# Patient Record
Sex: FEMALE | Race: WHITE | NOT HISPANIC OR LATINO | Employment: OTHER
[De-identification: names, ages, dates, MRNs, and addresses within clinical notes are randomized per-mention and may not be internally consistent; named-entity substitution may affect disease eponyms.]

---

## 2018-11-13 ENCOUNTER — RECORDS - HEALTHEAST (OUTPATIENT)
Dept: ADMINISTRATIVE | Facility: OTHER | Age: 73
End: 2018-11-13

## 2020-01-23 ENCOUNTER — RECORDS - HEALTHEAST (OUTPATIENT)
Dept: ADMINISTRATIVE | Facility: OTHER | Age: 75
End: 2020-01-23

## 2020-01-27 ENCOUNTER — TELEPHONE (OUTPATIENT)
Dept: FAMILY MEDICINE | Facility: CLINIC | Age: 75
End: 2020-01-27

## 2020-01-27 ENCOUNTER — OFFICE VISIT (OUTPATIENT)
Dept: FAMILY MEDICINE | Facility: CLINIC | Age: 75
End: 2020-01-27
Payer: MEDICARE

## 2020-01-27 VITALS
BODY MASS INDEX: 27.97 KG/M2 | DIASTOLIC BLOOD PRESSURE: 76 MMHG | HEIGHT: 62 IN | RESPIRATION RATE: 20 BRPM | WEIGHT: 152 LBS | TEMPERATURE: 97 F | SYSTOLIC BLOOD PRESSURE: 124 MMHG | HEART RATE: 76 BPM

## 2020-01-27 DIAGNOSIS — A08.4 VIRAL GASTROENTERITIS: ICD-10-CM

## 2020-01-27 PROCEDURE — 99203 OFFICE O/P NEW LOW 30 MIN: CPT | Performed by: FAMILY MEDICINE

## 2020-01-27 RX ORDER — NETARSUDIL 0.2 MG/ML
SOLUTION/ DROPS OPHTHALMIC; TOPICAL
COMMUNITY
Start: 2020-01-07 | End: 2021-12-30

## 2020-01-27 RX ORDER — SODIUM CHLORIDE 50 MG/G
OINTMENT OPHTHALMIC
COMMUNITY
Start: 2019-11-11 | End: 2021-09-17

## 2020-01-27 RX ORDER — ONDANSETRON 4 MG/1
TABLET, ORALLY DISINTEGRATING ORAL
Qty: 15 TABLET | Refills: 1 | Status: SHIPPED | OUTPATIENT
Start: 2020-01-27 | End: 2021-09-10

## 2020-01-27 RX ORDER — DONEPEZIL HYDROCHLORIDE 10 MG/1
TABLET, FILM COATED ORAL
COMMUNITY
Start: 2019-12-05 | End: 2021-09-10

## 2020-01-27 RX ORDER — LEVOTHYROXINE SODIUM 100 UG/1
100 TABLET ORAL DAILY
COMMUNITY
Start: 2019-11-09 | End: 2022-05-12

## 2020-01-27 RX ORDER — SIMVASTATIN 40 MG
TABLET ORAL
COMMUNITY
Start: 2019-11-09 | End: 2021-08-27

## 2020-01-27 RX ORDER — BIMATOPROST 0.1 MG/ML
SOLUTION/ DROPS OPHTHALMIC
COMMUNITY
Start: 2019-11-11 | End: 2021-12-30

## 2020-01-27 ASSESSMENT — MIFFLIN-ST. JEOR: SCORE: 1137.72

## 2020-01-27 ASSESSMENT — PAIN SCALES - GENERAL: PAINLEVEL: SEVERE PAIN (6)

## 2020-01-27 NOTE — TELEPHONE ENCOUNTER
Patient's daughter, More called today.    She would like patient to be seen at  the 12:00 PM HOLD SLOT with Dr. Danielson today for a hospital f/u.    Patient was seen at [facility].    Patient was seen for a concussion (fall injury); diarrhea; and vomiting.    Please contact daughter for approval.    Thank you.    Central Scheduling  Harper GILMORE

## 2020-01-27 NOTE — PROGRESS NOTES
Subjective     Shalini Mann is a 74 year old female who presents to clinic today for the following health issues:    HPI   Patient seen at [facility]- for viral gastroenteritis and a hematoma noted on forehead from possible fall.  Labs and CT head/cervical spine all were negative.  Advised Follow-up with recheck of blood pressure in clinic.  Still with diarrhea.  Tolerating po fluids well- largely H20 and Gatorade.  Still with low-grade nausea- not taking in much solid foods.  No fever.  No stomach pain.    Current Chronic Medical Conditions  Memory issues  Hypothyroidism  Hyperlipidemia  Glaucoma    Social History  Recently moved here from [place].  Lives in a senior living facility.  Here with daughter.    Patient Active Problem List   Diagnosis     Viral gastroenteritis     History reviewed. No pertinent surgical history.    Social History     Tobacco Use     Smoking status: Never Smoker     Smokeless tobacco: Never Used   Substance Use Topics     Alcohol use: Not on file     History reviewed. No pertinent family history.      Current Outpatient Medications   Medication Sig Dispense Refill     donepezil (ARICEPT) 10 MG tablet        levothyroxine (SYNTHROID/LEVOTHROID) 100 MCG tablet        LUMIGAN 0.01 % SOLN ophthalmic solution        BEBETO 128 5 % ophthalmic ointment APPLY TO LOWER CULDESAC OF EACH EYE NIGHTLY BEFORE BED       omeprazole (PRILOSEC) 20 MG DR capsule        ondansetron (ZOFRAN-ODT) 4 MG ODT tab 1-2 tablets q 8 hours prn nausea 15 tablet 1     RHOPRESSA 0.02 % SOLN        simvastatin (ZOCOR) 40 MG tablet        No Known Allergies  No lab results found.   BP Readings from Last 3 Encounters:   01/27/20 124/76   07/21/15 137/69    Wt Readings from Last 3 Encounters:   01/27/20 68.9 kg (152 lb)                    Reviewed and updated as needed this visit by Provider         Review of Systems   ROS COMP: Constitutional, HEENT, cardiovascular, pulmonary, gi and gu systems are negative,  "except as otherwise noted.      Objective    /76   Pulse 76   Temp 97  F (36.1  C) (Tympanic)   Resp 20   Ht 1.575 m (5' 2\")   Wt 68.9 kg (152 lb)   LMP  (LMP Unknown)   Breastfeeding No   BMI 27.80 kg/m    Body mass index is 27.8 kg/m .  Physical Exam   GENERAL: healthy, alert and no distress  EYES: Eyes grossly normal to inspection, PERRL and conjunctivae and sclerae normal  NECK: no adenopathy, no asymmetry, masses, or scars and thyroid normal to palpation  RESP: lungs clear to auscultation - no rales, rhonchi or wheezes  CV: regular rate and rhythm, normal S1 S2, no S3 or S4, no murmur, click or rub, no peripheral edema and peripheral pulses strong  ABDOMEN: soft, nontender, no hepatosplenomegaly, no masses and bowel sounds normal  MS: no gross musculoskeletal defects noted, no edema  PSYCH: mentation appears normal, affect normal/bright        Assessment & Plan     1. Viral gastroenteritis    - ondansetron (ZOFRAN-ODT) 4 MG ODT tab; 1-2 tablets q 8 hours prn nausea  Dispense: 15 tablet; Refill: 1    Recommend changing over to Smart Water and Gatorade and white soda only- no further tap water only as she needs the electrolytes and calorie replacement along with close adherence to a BRAT diet for nutrition.  Zofran prescribed so will begin to eat as states she feels hungry.  Close Follow-up if no change or worsening symptoms for stool sample testing and/or IVF prn.    Pooja Danielson MD  [facility]    "

## 2020-01-27 NOTE — TELEPHONE ENCOUNTER
I talked to More, pt's daughter.  Got pt scheduled for ER f/u appt today.  No new meds.  No fall.  CT done in ER.  Pt is still having diarrhea. Since last Thursday.  In ER on 1/23/2020. Got IV fluids and zofran.  Diarrhea every 4 hours.  Still voiding.  Rec pushing fluids.  JULIETA Reynoso

## 2020-01-29 ENCOUNTER — TELEPHONE (OUTPATIENT)
Dept: FAMILY MEDICINE | Facility: CLINIC | Age: 75
End: 2020-01-29

## 2020-01-29 NOTE — TELEPHONE ENCOUNTER
Spoke with dtr  She said her mom looks terrible but she hasn't had any more diarrhea since she was in on Monday  But having a lot of nausea, upper abd pain and gas from both ends  No fever  Monday afternoon-had banana and ritz crackers, applesauce- and drank 24oz of smart water  Then kept complaining her stomach hurts-higher up  Hasn't pooped in almost 48 hours(reassurance given)  Is urinating fine due to drinking enough fluids  Keeps saying she is nauseated  No gallbladder  Reassurance given  Advised to stop brat diet but continue bland  And to give 2 zofran and then have chic noodle soup, jello and/or sherbet  If mom gets in terrible abdominal pain and/or has fever   They should go to the ER  dtr in agreement with the plan  Hui Medina RN

## 2020-01-29 NOTE — TELEPHONE ENCOUNTER
Reason for Call:  Other call back    Detailed comments: Patients daughter states her SX are still persisting and she would like a call back to know if she should come back in or take her to the ER or what is advised. Please assist. Thanks!    Phone Number Patient can be reached at: Other phone number: [phone]    Best Time: Any    Can we leave a detailed message on this number? YES    Call taken on 1/29/2020 at 10:17 AM by Kim Sue

## 2020-02-20 ENCOUNTER — COMMUNICATION - HEALTHEAST (OUTPATIENT)
Dept: FAMILY MEDICINE | Facility: CLINIC | Age: 75
End: 2020-02-20

## 2020-02-20 ENCOUNTER — OFFICE VISIT - HEALTHEAST (OUTPATIENT)
Dept: FAMILY MEDICINE | Facility: CLINIC | Age: 75
End: 2020-02-20

## 2020-02-20 DIAGNOSIS — Z86.0100 HISTORY OF COLONIC POLYPS: ICD-10-CM

## 2020-02-20 DIAGNOSIS — K52.9 GASTROENTERITIS: ICD-10-CM

## 2020-02-20 DIAGNOSIS — K21.9 GASTROESOPHAGEAL REFLUX DISEASE WITHOUT ESOPHAGITIS: ICD-10-CM

## 2020-02-20 DIAGNOSIS — E53.8 VITAMIN B12 DEFICIENCY (NON ANEMIC): ICD-10-CM

## 2020-02-20 DIAGNOSIS — E78.2 MIXED HYPERLIPIDEMIA: ICD-10-CM

## 2020-02-20 DIAGNOSIS — G31.84 MILD COGNITIVE IMPAIRMENT WITH MEMORY LOSS: ICD-10-CM

## 2020-02-20 DIAGNOSIS — Z86.007 HISTORY OF SQUAMOUS CELL CARCINOMA IN SITU: ICD-10-CM

## 2020-02-20 DIAGNOSIS — E89.0 POSTABLATIVE HYPOTHYROIDISM: ICD-10-CM

## 2020-02-20 DIAGNOSIS — R79.9 ABNORMAL FINDING OF BLOOD CHEMISTRY, UNSPECIFIED: ICD-10-CM

## 2020-02-20 DIAGNOSIS — R63.4 WEIGHT LOSS: ICD-10-CM

## 2020-02-20 DIAGNOSIS — M85.80 OSTEOPENIA, UNSPECIFIED LOCATION: ICD-10-CM

## 2020-02-20 LAB
ALBUMIN SERPL-MCNC: 3.8 G/DL (ref 3.5–5)
ALP SERPL-CCNC: 68 U/L (ref 45–120)
ALT SERPL W P-5'-P-CCNC: <9 U/L (ref 0–45)
ANION GAP SERPL CALCULATED.3IONS-SCNC: 9 MMOL/L (ref 5–18)
AST SERPL W P-5'-P-CCNC: 13 U/L (ref 0–40)
BILIRUB SERPL-MCNC: 0.7 MG/DL (ref 0–1)
BUN SERPL-MCNC: 9 MG/DL (ref 8–28)
CALCIUM SERPL-MCNC: 9.4 MG/DL (ref 8.5–10.5)
CHLORIDE BLD-SCNC: 101 MMOL/L (ref 98–107)
CO2 SERPL-SCNC: 27 MMOL/L (ref 22–31)
CREAT SERPL-MCNC: 0.65 MG/DL (ref 0.6–1.1)
ERYTHROCYTE [DISTWIDTH] IN BLOOD BY AUTOMATED COUNT: 12 % (ref 11–14.5)
FERRITIN SERPL-MCNC: 99 NG/ML (ref 10–130)
GFR SERPL CREATININE-BSD FRML MDRD: >60 ML/MIN/1.73M2
GLUCOSE BLD-MCNC: 93 MG/DL (ref 70–125)
HCT VFR BLD AUTO: 36.1 % (ref 35–47)
HGB BLD-MCNC: 12 G/DL (ref 12–16)
MCH RBC QN AUTO: 29.4 PG (ref 27–34)
MCHC RBC AUTO-ENTMCNC: 33.1 G/DL (ref 32–36)
MCV RBC AUTO: 89 FL (ref 80–100)
PLATELET # BLD AUTO: 254 THOU/UL (ref 140–440)
PMV BLD AUTO: 6.9 FL (ref 7–10)
POTASSIUM BLD-SCNC: 3.7 MMOL/L (ref 3.5–5)
PROT SERPL-MCNC: 6.4 G/DL (ref 6–8)
RBC # BLD AUTO: 4.07 MILL/UL (ref 3.8–5.4)
SODIUM SERPL-SCNC: 137 MMOL/L (ref 136–145)
TSH SERPL DL<=0.005 MIU/L-ACNC: 0.37 UIU/ML (ref 0.3–5)
VIT B12 SERPL-MCNC: 354 PG/ML (ref 213–816)
WBC: 6.4 THOU/UL (ref 4–11)

## 2020-02-20 ASSESSMENT — MIFFLIN-ST. JEOR: SCORE: 1117.41

## 2020-02-24 ENCOUNTER — COMMUNICATION - HEALTHEAST (OUTPATIENT)
Dept: FAMILY MEDICINE | Facility: CLINIC | Age: 75
End: 2020-02-24

## 2020-02-25 ENCOUNTER — AMBULATORY - HEALTHEAST (OUTPATIENT)
Dept: LAB | Facility: CLINIC | Age: 75
End: 2020-02-25

## 2020-02-25 DIAGNOSIS — R63.4 WEIGHT LOSS: ICD-10-CM

## 2020-02-25 DIAGNOSIS — K52.9 GASTROENTERITIS: ICD-10-CM

## 2020-02-25 LAB
ALBUMIN UR-MCNC: NEGATIVE MG/DL
APPEARANCE UR: CLEAR
BILIRUB UR QL STRIP: NEGATIVE
C DIFF TOX B STL QL: NEGATIVE
COLOR UR AUTO: YELLOW
GLUCOSE UR STRIP-MCNC: NEGATIVE MG/DL
HGB UR QL STRIP: NEGATIVE
KETONES UR STRIP-MCNC: NEGATIVE MG/DL
LEUKOCYTE ESTERASE UR QL STRIP: NEGATIVE
NITRATE UR QL: NEGATIVE
PH UR STRIP: 7 [PH] (ref 5–8)
RIBOTYPE 027/NAP1/BI: NORMAL
SP GR UR STRIP: 1.01 (ref 1–1.03)
UROBILINOGEN UR STRIP-ACNC: NORMAL

## 2020-02-26 LAB
G LAMBLIA AG STL QL IA: NORMAL
O+P STL MICRO: NORMAL
O+P STL MICRO: NORMAL
SHIGA TOXIN 1: NEGATIVE
SHIGA TOXIN 2: NEGATIVE

## 2020-02-28 LAB — BACTERIA SPEC CULT: NORMAL

## 2020-03-02 ENCOUNTER — COMMUNICATION - HEALTHEAST (OUTPATIENT)
Dept: FAMILY MEDICINE | Facility: CLINIC | Age: 75
End: 2020-03-02

## 2020-08-04 ENCOUNTER — COMMUNICATION - HEALTHEAST (OUTPATIENT)
Dept: FAMILY MEDICINE | Facility: CLINIC | Age: 75
End: 2020-08-04

## 2020-08-04 DIAGNOSIS — E89.0 POSTABLATIVE HYPOTHYROIDISM: ICD-10-CM

## 2020-08-04 DIAGNOSIS — E78.2 MIXED HYPERLIPIDEMIA: ICD-10-CM

## 2020-08-04 DIAGNOSIS — K21.9 GASTROESOPHAGEAL REFLUX DISEASE WITHOUT ESOPHAGITIS: ICD-10-CM

## 2020-08-05 ENCOUNTER — COMMUNICATION - HEALTHEAST (OUTPATIENT)
Dept: FAMILY MEDICINE | Facility: CLINIC | Age: 75
End: 2020-08-05

## 2020-11-09 ENCOUNTER — COMMUNICATION - HEALTHEAST (OUTPATIENT)
Dept: FAMILY MEDICINE | Facility: CLINIC | Age: 75
End: 2020-11-09

## 2020-11-09 DIAGNOSIS — E78.2 MIXED HYPERLIPIDEMIA: ICD-10-CM

## 2020-11-09 DIAGNOSIS — K21.9 GASTROESOPHAGEAL REFLUX DISEASE WITHOUT ESOPHAGITIS: ICD-10-CM

## 2020-11-09 DIAGNOSIS — E89.0 POSTABLATIVE HYPOTHYROIDISM: ICD-10-CM

## 2020-11-10 ENCOUNTER — COMMUNICATION - HEALTHEAST (OUTPATIENT)
Dept: FAMILY MEDICINE | Facility: CLINIC | Age: 75
End: 2020-11-10

## 2020-11-10 DIAGNOSIS — E89.0 POSTABLATIVE HYPOTHYROIDISM: ICD-10-CM

## 2021-01-19 ENCOUNTER — COMMUNICATION - HEALTHEAST (OUTPATIENT)
Dept: FAMILY MEDICINE | Facility: CLINIC | Age: 76
End: 2021-01-19

## 2021-01-19 DIAGNOSIS — E89.0 POSTABLATIVE HYPOTHYROIDISM: ICD-10-CM

## 2021-04-13 ENCOUNTER — COMMUNICATION - HEALTHEAST (OUTPATIENT)
Dept: FAMILY MEDICINE | Facility: CLINIC | Age: 76
End: 2021-04-13

## 2021-04-13 DIAGNOSIS — E89.0 POSTABLATIVE HYPOTHYROIDISM: ICD-10-CM

## 2021-04-22 ENCOUNTER — COMMUNICATION - HEALTHEAST (OUTPATIENT)
Dept: FAMILY MEDICINE | Facility: CLINIC | Age: 76
End: 2021-04-22

## 2021-04-22 DIAGNOSIS — E78.2 MIXED HYPERLIPIDEMIA: ICD-10-CM

## 2021-04-22 DIAGNOSIS — K21.9 GASTROESOPHAGEAL REFLUX DISEASE WITHOUT ESOPHAGITIS: ICD-10-CM

## 2021-05-16 ENCOUNTER — COMMUNICATION - HEALTHEAST (OUTPATIENT)
Dept: FAMILY MEDICINE | Facility: CLINIC | Age: 76
End: 2021-05-16

## 2021-05-16 DIAGNOSIS — K21.9 GASTROESOPHAGEAL REFLUX DISEASE WITHOUT ESOPHAGITIS: ICD-10-CM

## 2021-05-16 DIAGNOSIS — E78.2 MIXED HYPERLIPIDEMIA: ICD-10-CM

## 2021-05-27 ENCOUNTER — RECORDS - HEALTHEAST (OUTPATIENT)
Dept: ADMINISTRATIVE | Facility: CLINIC | Age: 76
End: 2021-05-27

## 2021-06-04 VITALS
WEIGHT: 149.5 LBS | BODY MASS INDEX: 27.51 KG/M2 | SYSTOLIC BLOOD PRESSURE: 104 MMHG | HEIGHT: 62 IN | HEART RATE: 64 BPM | DIASTOLIC BLOOD PRESSURE: 62 MMHG

## 2021-06-06 NOTE — TELEPHONE ENCOUNTER
Left message to call back for: More pts daughter  Information to relay to patient:  Please see results message below from Nguyen and relay to evi Aguero daughter when she calls back. Thanks!

## 2021-06-06 NOTE — TELEPHONE ENCOUNTER
----- Message from Nguyen Rivera CNP sent at 2/28/2020  2:53 PM CST -----  Please call patient's daughter and let her know that all of the stool cultures were negative.  I recommend proceeding with colonoscopy as we discussed

## 2021-06-06 NOTE — TELEPHONE ENCOUNTER
Who is calling:  Patient   Reason for Call:  Patient has questions on instructions for Lab portions/ stool sample, etc   Date of last appointment with primary care: 2/20/20  Okay to leave a detailed message: Yes, Patient would like to speak to care team about instructions for Lab

## 2021-06-06 NOTE — PROGRESS NOTES
Assessment & Plan   1. Gastroenteritis  Acute gastroenteritis 1 month ago with persistent watery diarrhea.  Check electrolytes and kidney function.  We will send her home with stool kits today to bring back.  She does have a history of colon polyps and is due for her colonoscopy.  Additionally she has noted black stools and a weight loss of 19 pounds in the last year.  Highly recommended completing this in the near future and referral order was placed.  - HM2(CBC w/o Differential)  - Comprehensive Metabolic Panel  - Ferritin  - C. difficile Toxigenic by PCR; Future  - Giardia Detection, Stool; Future  - Ova and Parasite, Stool; Future  - Culture, Stool; Future  - Ova and Parasite, Stool; Future  - Ambulatory referral for Colonoscopy  - Urinalysis-UC if Indicated    2. Weight loss  Unclear how much related to recent illness and how much previous weight loss.  Did discuss possible relation with advancing Alzheimer's disease.  She did a dietary recall with me today and she is seemingly consuming sufficient calories for her body mass.  - Ambulatory referral for Colonoscopy    3. History of colonic polyps  - Ambulatory referral for Colonoscopy    4. Mild cognitive impairment with memory loss  Follows with Dr. Valencia, neurology.  No longer taking donepezil.    5. Postablative hypothyroidism  Recheck TSH  - Thyroid Cascade    6. History of squamous cell carcinoma in situ    7. Vitamin B12 deficiency (non anemic)  Recheck B12 and advise on dosing.  - Vitamin B12    8. Gastroesophageal reflux disease without esophagitis    9. Mixed hyperlipidemia    10. Osteopenia, unspecified location    11. Abnormal finding of blood chemistry, unspecified   - Ferritin    Nguyen Rivera CNP     Total Time: 45 minutes.  Coordination of Care Time: 40 minutes spent including:  History, exam, discussion of possible diagnoses, testing today, next steps and follow up.    Subjective   Chief Complaint:  Establish Care; Abdominal Pain; and  Diarrhea    HPI:   Shalini Mann is a 74 y.o. female who presents for establish care.   Transfer from [facility] bring records. PMH notable for Grave's with postablative hypothyroidism, Alzheimers, B12 deficiency, hyperlipidemia, SCC, colon polyps.  Moved here last summer to be near daughter. Lives in senior living, independent apartment.      Her main concern today is GI symptoms.  The facility in which she lives was hit with a norovirus in mid January.  The entire place was quarantined for 3 weeks.  Her daughter states she stayed with her for approximately a week with cramping abdominal pain and diarrhea.  She was seen in the [facility] emergency room on 1/23.  Labs normal at that time.  Her daughter does note that symptoms began approximately 10 months days after starting 10mg of donepezil.  They have subsequently stopped that medication however diarrhea has continued now.  She continues to have at least 1-2 watery stools a day.  She states it was previously brown though now has a black/green appearance.  No blood noted in the stool.  Initially cramping and low appetite.  Appetite has now returned to normal.  She is eating a regular diet.  She only feels pain in the abdomen immediately before the need to have a bowel movement and this is relieved after having a bowel movement.  She does have a history of colon polyps.  Last colonoscopy in 2010 with instructions to repeat in 10 years.    Daughter notes she has had approximately 19 pound weight loss in the last year.  They are unsure how much weight she has lost within the last month    Cognitive: History of Alzheimer's.  Started on donepezil 10mg.  Follows with Dr. Valencia    Thyroid: History of Graves' disease, post ablative hypothyroidism.  Currently on levothyroxine 100 mcg.  Last check over a year ago.    B12 deficiency: Daughter states they did IM injections monthly for approximately a year though she has not had an injection in several months.  She is not  "taking any oral supplements.      Allergies:  has No Known Allergies.    SH/FH:  Social History and Family History reviewed and updated.   Tobacco Status:  She  reports that she has quit smoking. She has never used smokeless tobacco.    Review of Systems:  A complete head to toe ROS is negative unless otherwise noted in HPI    Objective     Vitals:    02/20/20 1121   BP: 104/62   Patient Site: Right Arm   Patient Position: Sitting   Cuff Size: Adult Large   Pulse: 64   Weight: 149 lb 8 oz (67.8 kg)   Height: 5' 1.75\" (1.568 m)       Physical Exam:  GENERAL: Alert, well-appearing female .   PSYCH: Pleasant mood, affect appropriate.  Good judgment and insight.   SKIN: No rashes  EYES: Conjunctiva pink, sclera white, no exudates. KIRK.  EOMs intact.   EARS: TMs pearly grey, no bulging, redness, retraction.   NOSE: Nares patent, no discharge.  Normal nasal mucosa, septum and turbinates.  MOUTH: Pharynx moist, pink without exudate. No tonsillar enlargement  NECK: No lymphadenopathy. Thyroid enlarged, smooth borders  CV: Regular rate and rhythm without murmurs, rubs or gallops.  RESP: Lung sounds clear  ABDOMEN: BS+. Abdomen soft.  TTP in LLQ.  No organomegaly, masses or hernias        "

## 2021-06-06 NOTE — TELEPHONE ENCOUNTER
Spoke with pts daughter and relayed results message.  No further questions at this time.  She will drop off the stool samples tomorrow.

## 2021-06-06 NOTE — TELEPHONE ENCOUNTER
----- Message from Nguyen Rivera CNP sent at 2/21/2020  2:18 PM CST -----  Please call patient's daughter (main contact).  All of her labs look completely normal.  I recommend continuing to try to focus on hydration and we will see what her stool studies look like when those return.

## 2021-06-06 NOTE — PATIENT INSTRUCTIONS - HE
Recommendations from today's visit                                                       1. We will check labs today to ensure you are staying well hydrated and kidneys are doing well.  We will also check your thyroid and several other labs due to weight loss    2. We will collect stool samples to look for signs of treatable infection.     3. I ordered a repeat colonoscopy as well and you will receive a call to schedule this.     Next appointment: 6 months    To reschedule your appointment, please call the clinic directly at [phone].   It was a pleasure seeing you today! I look forward to seeing you again.

## 2021-06-06 NOTE — TELEPHONE ENCOUNTER
Patient Returning Call  Reason for call: Lab results.  More, the patient's daughter returning Celia, [facility]'s call.   Information relayed to patient: Writer unable to relay the message below as indicated. Writer informed the caller there is not a consent to communicate on file.    Patient has additional questions:  Yes  If YES, what are your questions/concerns: The patient's daughter would like Celia to return call.  Okay to leave a detailed message?: No   Writer did not ask. The caller is upset due to writer unable to give message.  More, the patient's daughter. [phone]

## 2021-06-10 NOTE — TELEPHONE ENCOUNTER
RN cannot approve Refill Request    RN can NOT refill this medication historical medication requested. Last office visit: 2/20/2020 Nguyen Rivera CNP Last Physical: Visit date not found Last MTM visit: Visit date not found Last visit same specialty: 2/20/2020 Nguyen Rivera CNP.  Next visit within 3 mo: Visit date not found  Next physical within 3 mo: Visit date not found      Elise Goldstein, [facility] Triage/Med Refill 8/5/2020    Requested Prescriptions   Pending Prescriptions Disp Refills     levothyroxine (SYNTHROID, LEVOTHROID) 100 MCG tablet [Pharmacy Med Name: LEVOTHYROXINE 100 MCG TABLET] 90 tablet 1     Sig: TAKE 1 TABLET BY MOUTH EVERY DAY       Thyroid Hormones Protocol Passed - 8/5/2020 11:14 AM        Passed - Provider visit in past 12 months or next 3 months     Last office visit with prescriber/PCP: 2/20/2020 Nguyen Rivera CNP OR same dept: 2/20/2020 Nguyen Rivera CNP OR same specialty: 2/20/2020 Nguyen Rivera CNP  Last physical: Visit date not found Last MTM visit: Visit date not found   Next visit within 3 mo: Visit date not found  Next physical within 3 mo: Visit date not found  Prescriber OR PCP: Nguyen Rivera CNP  Last diagnosis associated with med order: There are no diagnoses linked to this encounter.  If protocol passes may refill for 12 months if within 3 months of last provider visit (or a total of 15 months).             Passed - TSH on file in past 12 months for patient age 12 & older     TSH   Date Value Ref Range Status   02/20/2020 0.37 0.30 - 5.00 uIU/mL Final

## 2021-06-10 NOTE — TELEPHONE ENCOUNTER
Medication Request  Medication name:   levothyroxine (SYNTHROID, LEVOTHROID) 100 MCG tablet    11/9/2019      Class: Historical Med       omeprazole (PRILOSEC) 20 MG capsule    11/9/2019      Class: Historical Med       simvastatin (ZOCOR) 40 MG tablet    11/9/2019      Class: Historical Med           Requested Pharmacy: [facility]  Reason for request: Patient is out of meds.    Okay to leave a detailed message: yes

## 2021-06-12 NOTE — TELEPHONE ENCOUNTER
Refill Request  Did you contact pharmacy: No  Medication name:   Requested Prescriptions     Pending Prescriptions Disp Refills     levothyroxine (SYNTHROID, LEVOTHROID) 100 MCG tablet 90 tablet 0     Sig: Take 1 tablet (100 mcg total) by mouth Daily at 6:00 am.     omeprazole (PRILOSEC) 20 MG capsule 90 capsule 0     Sig: Take 1 capsule (20 mg total) by mouth daily before breakfast.     simvastatin (ZOCOR) 40 MG tablet 90 tablet 0     Sig: Take 1 tablet (40 mg total) by mouth at bedtime.     Who prescribed the medication: Terry Araiza  Requested Pharmacy: [facility]  Is patient out of medication: No.  3 days left  Patient notified refills processed in 3 business days:  yes  Okay to leave a detailed message: no

## 2021-06-13 NOTE — TELEPHONE ENCOUNTER
Refill Approved    Rx renewed per Medication Renewal Policy. Medication was last renewed on 8/5/20.    Elise Goldstein, [facility] Triage/Med Refill 11/12/2020     Requested Prescriptions   Pending Prescriptions Disp Refills     levothyroxine (SYNTHROID, LEVOTHROID) 100 MCG tablet 90 tablet 0     Sig: Take 1 tablet (100 mcg total) by mouth Daily at 6:00 am.       Thyroid Hormones Protocol Passed - 11/12/2020 11:10 AM        Passed - Provider visit in past 12 months or next 3 months     Last office visit with prescriber/PCP: 2/20/2020 Nguyen Rivera CNP OR same dept: 2/20/2020 Nguyen Rivera CNP OR same specialty: 2/20/2020 Nguyen Rivera CNP  Last physical: Visit date not found Last MTM visit: Visit date not found   Next visit within 3 mo: Visit date not found  Next physical within 3 mo: Visit date not found  Prescriber OR PCP: Nguyen Rivera CNP  Last diagnosis associated with med order: 1. Postablative hypothyroidism  - levothyroxine (SYNTHROID, LEVOTHROID) 100 MCG tablet; Take 1 tablet (100 mcg total) by mouth Daily at 6:00 am.  Dispense: 90 tablet; Refill: 0    2. Gastroesophageal reflux disease without esophagitis  - omeprazole (PRILOSEC) 20 MG capsule; Take 1 capsule (20 mg total) by mouth daily before breakfast.  Dispense: 90 capsule; Refill: 0    3. Mixed hyperlipidemia  - simvastatin (ZOCOR) 40 MG tablet; Take 1 tablet (40 mg total) by mouth at bedtime.  Dispense: 90 tablet; Refill: 0    If protocol passes may refill for 12 months if within 3 months of last provider visit (or a total of 15 months).             Passed - TSH on file in past 12 months for patient age 12 & older     TSH   Date Value Ref Range Status   02/20/2020 0.37 0.30 - 5.00 uIU/mL Final                      omeprazole (PRILOSEC) 20 MG capsule 90 capsule 0     Sig: Take 1 capsule (20 mg total) by mouth daily before breakfast.       GI Medications Refill Protocol Passed - 11/12/2020 11:10 AM        Passed - PCP or prescribing provider  visit in last 12 or next 3 months.     Last office visit with prescriber/PCP: 2/20/2020 Nguyen Rivera CNP OR same dept: 2/20/2020 Nguyen Rivera CNP OR same specialty: 2/20/2020 Nguyen Rivera CNP  Last physical: Visit date not found Last MTM visit: Visit date not found   Next visit within 3 mo: Visit date not found  Next physical within 3 mo: Visit date not found  Prescriber OR PCP: Nguyen Rivera CNP  Last diagnosis associated with med order: 1. Postablative hypothyroidism  - levothyroxine (SYNTHROID, LEVOTHROID) 100 MCG tablet; Take 1 tablet (100 mcg total) by mouth Daily at 6:00 am.  Dispense: 90 tablet; Refill: 0    2. Gastroesophageal reflux disease without esophagitis  - omeprazole (PRILOSEC) 20 MG capsule; Take 1 capsule (20 mg total) by mouth daily before breakfast.  Dispense: 90 capsule; Refill: 0    3. Mixed hyperlipidemia  - simvastatin (ZOCOR) 40 MG tablet; Take 1 tablet (40 mg total) by mouth at bedtime.  Dispense: 90 tablet; Refill: 0    If protocol passes may refill for 12 months if within 3 months of last provider visit (or a total of 15 months).                simvastatin (ZOCOR) 40 MG tablet 90 tablet 0     Sig: Take 1 tablet (40 mg total) by mouth at bedtime.       Statins Refill Protocol (Hmg CoA Reductase Inhibitors) Passed - 11/12/2020 11:10 AM        Passed - PCP or prescribing provider visit in past 12 months      Last office visit with prescriber/PCP: 2/20/2020 Nguyen Rivera CNP OR same dept: 2/20/2020 Nguyen Rivera CNP OR same specialty: 2/20/2020 Nguyen Rivera CNP  Last physical: Visit date not found Last MTM visit: Visit date not found   Next visit within 3 mo: Visit date not found  Next physical within 3 mo: Visit date not found  Prescriber OR PCP: Nguyen Rivera CNP  Last diagnosis associated with med order: 1. Postablative hypothyroidism  - levothyroxine (SYNTHROID, LEVOTHROID) 100 MCG tablet; Take 1 tablet (100 mcg total) by mouth Daily at 6:00 am.  Dispense: 90 tablet; Refill:  0    2. Gastroesophageal reflux disease without esophagitis  - omeprazole (PRILOSEC) 20 MG capsule; Take 1 capsule (20 mg total) by mouth daily before breakfast.  Dispense: 90 capsule; Refill: 0    3. Mixed hyperlipidemia  - simvastatin (ZOCOR) 40 MG tablet; Take 1 tablet (40 mg total) by mouth at bedtime.  Dispense: 90 tablet; Refill: 0    If protocol passes may refill for 12 months if within 3 months of last provider visit (or a total of 15 months).

## 2021-06-13 NOTE — TELEPHONE ENCOUNTER
Refill Approved    Rx renewed per Medication Renewal Policy. Medication was last renewed on 11/12/20, last OV 2/20/20.    Rashmi Luna, [facility] Triage/Med Refill 11/14/2020     Requested Prescriptions   Pending Prescriptions Disp Refills     levothyroxine (SYNTHROID, LEVOTHROID) 100 MCG tablet [Pharmacy Med Name: LEVOTHYROXINE 100 MCG TABLET] 90 tablet 0     Sig: TAKE 1 TABLET BY MOUTH DAILY AT 6:00AM       Thyroid Hormones Protocol Passed - 11/10/2020 10:09 AM        Passed - Provider visit in past 12 months or next 3 months     Last office visit with prescriber/PCP: Visit date not found OR same dept: 2/20/2020 Nguyen Rivera CNP OR same specialty: 2/20/2020 Nguyen Rivera CNP  Last physical: Visit date not found Last MTM visit: Visit date not found   Next visit within 3 mo: Visit date not found  Next physical within 3 mo: Visit date not found  Prescriber OR PCP: Terry Araiza MD  Last diagnosis associated with med order: 1. Postablative hypothyroidism  - levothyroxine (SYNTHROID, LEVOTHROID) 100 MCG tablet [Pharmacy Med Name: LEVOTHYROXINE 100 MCG TABLET]; TAKE 1 TABLET BY MOUTH DAILY AT 6:00AM  Dispense: 90 tablet; Refill: 0    If protocol passes may refill for 12 months if within 3 months of last provider visit (or a total of 15 months).             Passed - TSH on file in past 12 months for patient age 12 & older     TSH   Date Value Ref Range Status   02/20/2020 0.37 0.30 - 5.00 uIU/mL Final

## 2021-06-14 NOTE — TELEPHONE ENCOUNTER
Refill Approved    Rx renewed per Medication Renewal Policy. Medication was last renewed on 11/12/20.    Elise Goldstein, [facility] Triage/Med Refill 1/20/2021     Requested Prescriptions   Pending Prescriptions Disp Refills     levothyroxine (SYNTHROID, LEVOTHROID) 100 MCG tablet [Pharmacy Med Name: LEVOTHYROXINE 100 MCG TABLET] 90 tablet 0     Sig: TAKE 1 TABLET (100 MCG TOTAL) BY MOUTH DAILY AT 6:00 AM.       Thyroid Hormones Protocol Passed - 1/19/2021  2:48 PM        Passed - Provider visit in past 12 months or next 3 months     Last office visit with prescriber/PCP: 2/20/2020 Nguyen Rivera CNP OR same dept: 2/20/2020 Nguyen Rivera CNP OR same specialty: 2/20/2020 Nguyen Rivera CNP  Last physical: Visit date not found Last MTM visit: Visit date not found   Next visit within 3 mo: Visit date not found  Next physical within 3 mo: Visit date not found  Prescriber OR PCP: Nguyen Rivera CNP  Last diagnosis associated with med order: 1. Postablative hypothyroidism  - levothyroxine (SYNTHROID, LEVOTHROID) 100 MCG tablet [Pharmacy Med Name: LEVOTHYROXINE 100 MCG TABLET]; Take 1 tablet (100 mcg total) by mouth Daily at 6:00 am.  Dispense: 90 tablet; Refill: 0    If protocol passes may refill for 12 months if within 3 months of last provider visit (or a total of 15 months).             Passed - TSH on file in past 12 months for patient age 12 & older     TSH   Date Value Ref Range Status   02/20/2020 0.37 0.30 - 5.00 uIU/mL Final

## 2021-06-16 NOTE — TELEPHONE ENCOUNTER
RN cannot approve Refill Request    RN can NOT refill this medication Protocol failed and NO refill given. Last office visit: 2/20/2020 Nguyen Rivera CNP Last Physical: Visit date not found Last MTM visit: Visit date not found Last visit same specialty: 2/20/2020 Nguyen Rivera CNP.  Next visit within 3 mo: Visit date not found  Next physical within 3 mo: Visit date not found      Deniz Ferrara, [facility] Triage/Med Refill 4/23/2021    Requested Prescriptions   Pending Prescriptions Disp Refills     simvastatin (ZOCOR) 40 MG tablet [Pharmacy Med Name: SIMVASTATIN 40 MG TABLET] 90 tablet 0     Sig: TAKE 1 TABLET BY MOUTH EVERYDAY AT BEDTIME       Statins Refill Protocol (Hmg CoA Reductase Inhibitors) Failed - 4/22/2021 11:49 AM        Failed - PCP or prescribing provider visit in past 12 months      Last office visit with prescriber/PCP: 2/20/2020 Nguyen Rivera CNP OR same dept: Visit date not found OR same specialty: 2/20/2020 Nguyen Rivera CNP  Last physical: Visit date not found Last MTM visit: Visit date not found   Next visit within 3 mo: Visit date not found  Next physical within 3 mo: Visit date not found  Prescriber OR PCP: Nguyen Rivera CNP  Last diagnosis associated with med order: 1. Mixed hyperlipidemia  - simvastatin (ZOCOR) 40 MG tablet [Pharmacy Med Name: SIMVASTATIN 40 MG TABLET]; TAKE 1 TABLET BY MOUTH EVERYDAY AT BEDTIME  Dispense: 90 tablet; Refill: 0    2. Gastroesophageal reflux disease without esophagitis  - omeprazole (PRILOSEC) 20 MG capsule [Pharmacy Med Name: OMEPRAZOLE DR 20 MG CAPSULE]; Take 1 capsule (20 mg total) by mouth daily before breakfast.  Dispense: 90 capsule; Refill: 0    If protocol passes may refill for 12 months if within 3 months of last provider visit (or a total of 15 months).                omeprazole (PRILOSEC) 20 MG capsule [Pharmacy Med Name: OMEPRAZOLE DR 20 MG CAPSULE] 90 capsule 0     Sig: TAKE 1 CAPSULE (20 MG TOTAL) BY MOUTH DAILY BEFORE BREAKFAST.        GI Medications Refill Protocol Failed - 4/22/2021 11:49 AM        Failed - PCP or prescribing provider visit in last 12 or next 3 months.     Last office visit with prescriber/PCP: 2/20/2020 Nguyen Rivera CNP OR same dept: Visit date not found OR same specialty: 2/20/2020 Nguyen Rivera CNP  Last physical: Visit date not found Last MTM visit: Visit date not found   Next visit within 3 mo: Visit date not found  Next physical within 3 mo: Visit date not found  Prescriber OR PCP: Nguyen Rivera CNP  Last diagnosis associated with med order: 1. Mixed hyperlipidemia  - simvastatin (ZOCOR) 40 MG tablet [Pharmacy Med Name: SIMVASTATIN 40 MG TABLET]; TAKE 1 TABLET BY MOUTH EVERYDAY AT BEDTIME  Dispense: 90 tablet; Refill: 0    2. Gastroesophageal reflux disease without esophagitis  - omeprazole (PRILOSEC) 20 MG capsule [Pharmacy Med Name: OMEPRAZOLE DR 20 MG CAPSULE]; Take 1 capsule (20 mg total) by mouth daily before breakfast.  Dispense: 90 capsule; Refill: 0    If protocol passes may refill for 12 months if within 3 months of last provider visit (or a total of 15 months).

## 2021-06-16 NOTE — TELEPHONE ENCOUNTER
RN cannot approve Refill Request    RN can NOT refill this medication PCP messaged that patient is overdue for Labs. Last office visit: 2/20/2020 Nguyen Rivera CNP Last Physical: Visit date not found Last MTM visit: Visit date not found Last visit same specialty: 2/20/2020 Nguyen Rivera CNP.  Next visit within 3 mo: Visit date not found  Next physical within 3 mo: Visit date not found      Rashmi Luna, [facility] Triage/Med Refill 4/13/2021    Requested Prescriptions   Pending Prescriptions Disp Refills     levothyroxine (SYNTHROID, LEVOTHROID) 100 MCG tablet [Pharmacy Med Name: LEVOTHYROXINE 100 MCG TABLET] 90 tablet 0     Sig: TAKE 1 TABLET (100 MCG TOTAL) BY MOUTH DAILY AT 6:00 AM.       Thyroid Hormones Protocol Failed - 4/13/2021 12:08 AM        Failed - Provider visit in past 12 months or next 3 months     Last office visit with prescriber/PCP: 2/20/2020 Nguyen Rivera CNP OR same dept: Visit date not found OR same specialty: 2/20/2020 Nguyen Rivera CNP  Last physical: Visit date not found Last MTM visit: Visit date not found   Next visit within 3 mo: Visit date not found  Next physical within 3 mo: Visit date not found  Prescriber OR PCP: Nguyen Rivera CNP  Last diagnosis associated with med order: 1. Postablative hypothyroidism  - levothyroxine (SYNTHROID, LEVOTHROID) 100 MCG tablet [Pharmacy Med Name: LEVOTHYROXINE 100 MCG TABLET]; TAKE 1 TABLET (100 MCG TOTAL) BY MOUTH DAILY AT 6:00 AM.  Dispense: 90 tablet; Refill: 0    If protocol passes may refill for 12 months if within 3 months of last provider visit (or a total of 15 months).             Failed - TSH on file in past 12 months for patient age 12 & older     TSH   Date Value Ref Range Status   02/20/2020 0.37 0.30 - 5.00 uIU/mL Final

## 2021-06-17 NOTE — TELEPHONE ENCOUNTER
RN cannot approve Refill Request    RN can NOT refill this medication PCP messaged that patient is overdue for Office Visit. Last office visit: Visit date not found Last Physical: Visit date not found Last MTM visit: Visit date not found Last visit same specialty: 2/20/2020 Nguyen Rivera CNP.  Next visit within 3 mo: Visit date not found  Next physical within 3 mo: Visit date not found      Rashmi Luna, [facility] Triage/Med Refill 5/16/2021    Requested Prescriptions   Pending Prescriptions Disp Refills     omeprazole (PRILOSEC) 20 MG capsule [Pharmacy Med Name: OMEPRAZOLE DR 20 MG CAPSULE] 30 capsule 0     Sig: TAKE 1 CAPSULE (20 MG TOTAL) BY MOUTH DAILY BEFORE BREAKFAST.       GI Medications Refill Protocol Failed - 5/16/2021 12:30 PM        Failed - PCP or prescribing provider visit in last 12 or next 3 months.     Last office visit with prescriber/PCP: Visit date not found OR same dept: Visit date not found OR same specialty: 2/20/2020 Nguyen Rivera CNP  Last physical: Visit date not found Last MTM visit: Visit date not found   Next visit within 3 mo: Visit date not found  Next physical within 3 mo: Visit date not found  Prescriber OR PCP: Kierra Green MD  Last diagnosis associated with med order: 1. Gastroesophageal reflux disease without esophagitis  - omeprazole (PRILOSEC) 20 MG capsule [Pharmacy Med Name: OMEPRAZOLE DR 20 MG CAPSULE]; TAKE 1 CAPSULE (20 MG TOTAL) BY MOUTH DAILY BEFORE BREAKFAST.  Dispense: 30 capsule; Refill: 0    2. Mixed hyperlipidemia  - simvastatin (ZOCOR) 40 MG tablet [Pharmacy Med Name: SIMVASTATIN 40 MG TABLET]; TAKE 1 TABLET BY MOUTH EVERYDAY AT BEDTIME  Dispense: 30 tablet; Refill: 0    If protocol passes may refill for 12 months if within 3 months of last provider visit (or a total of 15 months).                simvastatin (ZOCOR) 40 MG tablet [Pharmacy Med Name: SIMVASTATIN 40 MG TABLET] 30 tablet 0     Sig: TAKE 1 TABLET BY MOUTH EVERYDAY AT BEDTIME       Statins  Refill Protocol (Hmg CoA Reductase Inhibitors) Failed - 5/16/2021 12:30 PM        Failed - PCP or prescribing provider visit in past 12 months      Last office visit with prescriber/PCP: Visit date not found OR same dept: Visit date not found OR same specialty: 2/20/2020 Nguyen Rivera CNP  Last physical: Visit date not found Last MTM visit: Visit date not found   Next visit within 3 mo: Visit date not found  Next physical within 3 mo: Visit date not found  Prescriber OR PCP: Kierra Green MD  Last diagnosis associated with med order: 1. Gastroesophageal reflux disease without esophagitis  - omeprazole (PRILOSEC) 20 MG capsule [Pharmacy Med Name: OMEPRAZOLE DR 20 MG CAPSULE]; TAKE 1 CAPSULE (20 MG TOTAL) BY MOUTH DAILY BEFORE BREAKFAST.  Dispense: 30 capsule; Refill: 0    2. Mixed hyperlipidemia  - simvastatin (ZOCOR) 40 MG tablet [Pharmacy Med Name: SIMVASTATIN 40 MG TABLET]; TAKE 1 TABLET BY MOUTH EVERYDAY AT BEDTIME  Dispense: 30 tablet; Refill: 0    If protocol passes may refill for 12 months if within 3 months of last provider visit (or a total of 15 months).

## 2021-09-10 ENCOUNTER — ASSISTED LIVING VISIT (OUTPATIENT)
Dept: GERIATRICS | Facility: CLINIC | Age: 76
End: 2021-09-10
Payer: MEDICARE

## 2021-09-10 DIAGNOSIS — E78.5 DYSLIPIDEMIA: Primary | ICD-10-CM

## 2021-09-10 DIAGNOSIS — E03.9 HYPOTHYROIDISM, UNSPECIFIED TYPE: ICD-10-CM

## 2021-09-10 DIAGNOSIS — K21.00 GASTROESOPHAGEAL REFLUX DISEASE WITH ESOPHAGITIS WITHOUT HEMORRHAGE: ICD-10-CM

## 2021-09-10 DIAGNOSIS — E53.8 VITAMIN B12 DEFICIENCY (NON ANEMIC): ICD-10-CM

## 2021-09-10 RX ORDER — QUETIAPINE FUMARATE 25 MG/1
12.5 TABLET, FILM COATED ORAL 2 TIMES DAILY
COMMUNITY
End: 2021-09-17

## 2021-09-10 NOTE — PROGRESS NOTES
[facility] GERIATRIC SERVICES    Facility:   [facility] ([place]) [[id number]]   Code Status: DNI      HPI:   Shalini is a 76 year old female who is now admitted into the assisted living memory care unit room 2006 and who I the opportunity to visit with today secondary to medication management of her chronic medical conditions.  She is on Synthroid 100 mcg secondary hypothyroidism.  She is due for TSH.  She is also on Prilosec for GERD at 20 mg.  Denies any GERD or pyrosis.  She is also on Zocor 40 mg for dyslipidemia and is due for lipid panel.  She is also on eyedrops for glaucoma.  I had a chance to talk with her daughter More today.  She is now requiring the dementia unit.  There is some evening time perseveration as well as restlessness and we did talk about that sundowning type of behaviors and we talked about various medications and at this point we will initiate Seroquel 12.5 mg at 4 PM and then another 12.5 mg at bedtime.  In looking through the computer there have been no vital signs performed and no nursing notes related.  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  She recently moved up here from [place] about 2 years ago.  She denies any aches or pains.  Denies any swallowing problems colds or flus.  Is able to ambulate without any assistive device.    Past Medical History:  No past medical history on file.  Hypothyroidism, glaucoma, hyperlipidemia, dementia  Surgical History:  No past surgical history on file.  Unable to obtain secondary to dementia  Family History:   No family history on file.  Not able to obtain for dementia.  Social History:    Social History     Socioeconomic History     Marital status:      Spouse name: Not on file     Number of children: Not on file     Years of education: Not on file     Highest education level: Not on file   Occupational History     Not on file   Tobacco Use     Smoking status: Former Smoker     Smokeless  tobacco: Never Used   Substance and Sexual Activity     Alcohol use: Not on file     Drug use: Not on file     Sexual activity: Not on file   Other Topics Concern     Not on file   Social History Narrative     Not on file     Social Determinants of Health     Financial Resource Strain:      Difficulty of Paying Living Expenses:    Food Insecurity:      Worried About Running Out of Food in the Last Year:      Ran Out of Food in the Last Year:    Transportation Needs:      Lack of Transportation (Medical):      Lack of Transportation (Non-Medical):    Physical Activity:      Days of Exercise per Week:      Minutes of Exercise per Session:    Stress:      Feeling of Stress :    Social Connections:      Frequency of Communication with Friends and Family:      Frequency of Social Gatherings with Friends and Family:      Attends Islam Services:      Active Member of Clubs or Organizations:      Attends Club or Organization Meetings:      Marital Status:    Intimate Partner Violence:      Fear of Current or Ex-Partner:      Emotionally Abused:      Physically Abused:      Sexually Abused:         REVIEW OF SYSTEM:  According to staff there have been no reports of fever chills fatigue sore throat postnasal drip colds flus chills fever nausea vomiting diarrhea dysuria frequency urgency unusual myalgias or arthralgias.    PHYSICAL EXAM:   Pleasant female in no acute distress.  Head is normocephalic.  Conjunctiva is pink and sclerae clear.  Neck is supple without adenopathy.  Lung sounds are clear throughout.  Cardiovascular S1-S2, regular rate and rhythm.  No lower extremity edema.  Gastrointestinal nontender and nondistended.  Musculoskeletal able to ambulate without any assistive device.  Denies pain to her major joints.  Cognitive has dementia.  Pleasant affect.    There were no vitals filed for this visit.      Medication List:  Current Outpatient Medications   Medication Sig     levothyroxine (SYNTHROID/LEVOTHROID)  100 MCG tablet      LUMIGAN 0.01 % SOLN ophthalmic solution      BEBETO 128 5 % ophthalmic ointment APPLY TO LOWER CULDESAC OF EACH EYE NIGHTLY BEFORE BED     omeprazole (PRILOSEC) 20 MG DR capsule TAKE 1 CAPSULE (20 MG TOTAL) BY MOUTH DAILY BEFORE BREAKFAST.     RHOPRESSA 0.02 % SOLN      simvastatin (ZOCOR) 40 MG tablet TAKE 1 TABLET BY MOUTH EVERYDAY AT BEDTIME     No current facility-administered medications for this visit.        Labs:  TSH   Date Value Ref Range Status   02/20/2020 0.37 0.30 - 5.00 uIU/mL Final     No results for input(s): CHOL, HDL, LDL, TRIG, CHOLHDLRATIO in the last 89572 hours.  Liver Function Studies - Recent Labs   Lab Test 02/20/20  1218   PROTTOTAL 6.4   ALBUMIN 3.8   BILITOTAL 0.7   ALKPHOS 68   AST 13   ALT <9     CBC RESULTS: Recent Labs   Lab Test 02/20/20  1218   WBC 6.4   RBC 4.07   HGB 12.0   HCT 36.1   MCV 89   MCH 29.4   MCHC 33.1   RDW 12.0            Assessment:   (E78.5) Dyslipidemia  (primary encounter diagnosis)  Comment: He is on Zocor 40 mg  Plan: Rechecking the lipid panel on September 13    (E03.9) Hypothyroidism, unspecified type  Comment: Is on Synthroid 100 mcg  Plan: Rechecking TSH September 13    (E53.8) Vitamin B12 deficiency (non anemic)  Comment: History of B12 deficiency  Plan: Obtaining a B12 level    (K21.00) Gastroesophageal reflux disease with esophagitis without hemorrhage  Comment: Stable  Plan: Continue with omeprazole 20 mg      PLAN:    Obtaining a B12, TSH, lipid panel and CMP for September 13.  I also did call her daughter More today getting him gathering further information as well as a plan of care including the restlessness and sundowning type symptoms.  We will also add Seroquel 12.5 mg at 4 PM as well as another 12.5 mg at bedtime.    For documentation purposes total visit 45 minutes which over 50% was spent with the patient initially going over her history, establishing a relationship, listening to her stories, going over her  medications, going over her previous laboratory studies, nutrition as well as the remainder of the time spent not only talking with nursing and charge nurse staff but also her daughter More.      Electronically signed by: Seth Rider NP

## 2021-09-11 ENCOUNTER — LAB REQUISITION (OUTPATIENT)
Dept: LAB | Facility: CLINIC | Age: 76
End: 2021-09-11
Payer: MEDICARE

## 2021-09-11 DIAGNOSIS — E78.41 ELEVATED LIPOPROTEIN(A): ICD-10-CM

## 2021-09-11 DIAGNOSIS — E03.8 OTHER SPECIFIED HYPOTHYROIDISM: ICD-10-CM

## 2021-09-13 LAB
CHOLEST SERPL-MCNC: 168 MG/DL
FASTING STATUS PATIENT QL REPORTED: NORMAL
HDLC SERPL-MCNC: 58 MG/DL
LDLC SERPL CALC-MCNC: 89 MG/DL
TRIGL SERPL-MCNC: 106 MG/DL
TSH SERPL DL<=0.005 MIU/L-ACNC: <0.01 UIU/ML (ref 0.3–5)

## 2021-09-13 PROCEDURE — 84443 ASSAY THYROID STIM HORMONE: CPT | Mod: ORL | Performed by: FAMILY MEDICINE

## 2021-09-13 PROCEDURE — 80061 LIPID PANEL: CPT | Mod: ORL | Performed by: FAMILY MEDICINE

## 2021-09-13 PROCEDURE — 36415 COLL VENOUS BLD VENIPUNCTURE: CPT | Mod: ORL | Performed by: FAMILY MEDICINE

## 2021-09-13 PROCEDURE — P9603 ONE-WAY ALLOW PRORATED MILES: HCPCS | Mod: ORL | Performed by: FAMILY MEDICINE

## 2021-09-17 DIAGNOSIS — H04.123 DRY EYES: ICD-10-CM

## 2021-09-17 DIAGNOSIS — G30.9 ALZHEIMER'S DEMENTIA WITHOUT BEHAVIORAL DISTURBANCE, UNSPECIFIED TIMING OF DEMENTIA ONSET: Primary | ICD-10-CM

## 2021-09-17 DIAGNOSIS — F02.80 ALZHEIMER'S DEMENTIA WITHOUT BEHAVIORAL DISTURBANCE, UNSPECIFIED TIMING OF DEMENTIA ONSET: Primary | ICD-10-CM

## 2021-09-17 RX ORDER — QUETIAPINE FUMARATE 25 MG/1
TABLET, FILM COATED ORAL
Qty: 30 TABLET | Refills: 11 | Status: SHIPPED | OUTPATIENT
Start: 2021-09-17 | End: 2022-03-22

## 2021-09-17 RX ORDER — SODIUM CHLORIDE 50 MG/G
OINTMENT OPHTHALMIC
Qty: 3.5 G | Refills: 10 | Status: SHIPPED | OUTPATIENT
Start: 2021-09-17

## 2021-10-12 ENCOUNTER — LAB REQUISITION (OUTPATIENT)
Dept: LAB | Facility: CLINIC | Age: 76
End: 2021-10-12
Payer: MEDICARE

## 2021-10-12 DIAGNOSIS — E03.8 OTHER SPECIFIED HYPOTHYROIDISM: ICD-10-CM

## 2021-10-13 LAB — TSH SERPL DL<=0.005 MIU/L-ACNC: 19 UIU/ML (ref 0.3–5)

## 2021-10-13 PROCEDURE — P9604 ONE-WAY ALLOW PRORATED TRIP: HCPCS | Mod: ORL | Performed by: FAMILY MEDICINE

## 2021-10-13 PROCEDURE — 36415 COLL VENOUS BLD VENIPUNCTURE: CPT | Mod: ORL | Performed by: FAMILY MEDICINE

## 2021-10-13 PROCEDURE — 84443 ASSAY THYROID STIM HORMONE: CPT | Mod: ORL | Performed by: FAMILY MEDICINE

## 2021-10-15 ENCOUNTER — ASSISTED LIVING VISIT (OUTPATIENT)
Dept: GERIATRICS | Facility: CLINIC | Age: 76
End: 2021-10-15
Payer: MEDICARE

## 2021-10-15 VITALS
OXYGEN SATURATION: 97 % | DIASTOLIC BLOOD PRESSURE: 68 MMHG | BODY MASS INDEX: 28.87 KG/M2 | TEMPERATURE: 97.9 F | RESPIRATION RATE: 18 BRPM | SYSTOLIC BLOOD PRESSURE: 128 MMHG | HEART RATE: 74 BPM | WEIGHT: 156.6 LBS

## 2021-10-15 DIAGNOSIS — K21.00 GASTROESOPHAGEAL REFLUX DISEASE WITH ESOPHAGITIS WITHOUT HEMORRHAGE: ICD-10-CM

## 2021-10-15 DIAGNOSIS — F03.90 SENILE DEMENTIA, UNCOMPLICATED (H): ICD-10-CM

## 2021-10-15 DIAGNOSIS — E03.9 HYPOTHYROIDISM, UNSPECIFIED TYPE: Primary | ICD-10-CM

## 2021-10-15 DIAGNOSIS — R53.81 PHYSICAL DEBILITY: ICD-10-CM

## 2021-10-15 PROBLEM — F09 MILD COGNITIVE DISORDER: Status: ACTIVE | Noted: 2018-08-31

## 2021-10-15 PROBLEM — E53.8 VITAMIN B12 DEFICIENCY (NON ANEMIC): Status: ACTIVE | Noted: 2018-03-01

## 2021-10-15 PROBLEM — Z86.007 HISTORY OF SQUAMOUS CELL CARCINOMA IN SITU: Status: ACTIVE | Noted: 2020-02-20

## 2021-10-15 NOTE — PROGRESS NOTES
[facility] GERIATRIC SERVICES    Facility:   [facility] ([place]) [[id number]]   Code Status: DNR/DNI      CHIEF COMPLAINT/REASON FOR VISIT:  Chief Complaint   Patient presents with     RECHECK     asked to see, tsh, med mgmt       HISTORY:      HPI: Shalini is a 76 year old female Who I was asked to visit with secondary to recent TSH as well as medication management of chronic medical conditions.  Her TSH previously was 0.01 and she was on Synthroid 100 mcg and that was discontinued with a recheck of her TSH on October 13.  The TSH did come back at 19 and now we will put her on 50 mcg of Synthroid and recheck another TSH on November 15.  Had a pleasant visit with her as well.  She has a very nice woman and does have a lot of family pictures and she was able to share with me the family itself as well as various names and her prognosis of her family.  About 2 months ago it did end up adding Seroquel 12.5 mg at 4 PM and again at 8 PM secondary to evening time psychosis and restlessness and that has been helpful and the charge nurse does not feel we need to do anything suggesting.  There have been no recent vital signs performed.  She states that she has been in her normal state of health.  She did not have any colds or flus and overall feels pretty good and does like to participate in the activities.    No past medical history on file.         No family history on file.   Social History     Socioeconomic History     Marital status:      Spouse name: Not on file     Number of children: Not on file     Years of education: Not on file     Highest education level: Not on file   Occupational History     Not on file   Tobacco Use     Smoking status: Former Smoker     Smokeless tobacco: Never Used   Substance and Sexual Activity     Alcohol use: Not on file     Drug use: Not on file     Sexual activity: Not on file   Other Topics Concern     Not on file   Social History Narrative     Not on file     Social  Determinants of Health     Financial Resource Strain:      Difficulty of Paying Living Expenses:    Food Insecurity:      Worried About Running Out of Food in the Last Year:      Ran Out of Food in the Last Year:    Transportation Needs:      Lack of Transportation (Medical):      Lack of Transportation (Non-Medical):    Physical Activity:      Days of Exercise per Week:      Minutes of Exercise per Session:    Stress:      Feeling of Stress :    Social Connections:      Frequency of Communication with Friends and Family:      Frequency of Social Gatherings with Friends and Family:      Attends Caodaism Services:      Active Member of Clubs or Organizations:      Attends Club or Organization Meetings:      Marital Status:    Intimate Partner Violence:      Fear of Current or Ex-Partner:      Emotionally Abused:      Physically Abused:      Sexually Abused:         REVIEW OF SYSTEM:  According to staff there have been no reports of fever chills fatigue sore throat postnasal drip colds flus chills fever nausea vomiting diarrhea dysuria frequency urgency unusual myalgias or arthralgias.    PHYSICAL EXAM:   Pleasant female in no acute distress.  Head is normocephalic.  Neck is supple without adenopathy.  Lung sounds are clear throughout.  Cardiovascular S1-S2, regular rate and rhythm.  No lower extremity edema.  Gastrointestinal nontender and nondistended.  Musculoskeletal able to ambulate without any assistive device.  Denies pain to her major joints.  Cognitive has dementia.  Pleasant affect.     Current Outpatient Medications:      levothyroxine (SYNTHROID/LEVOTHROID) 100 MCG tablet, Take 50 mcg by mouth daily , Disp: , Rfl:      LUMIGAN 0.01 % SOLN ophthalmic solution, , Disp: , Rfl:      BEBETO 128 5 % ophthalmic ointment, APPLY TO LOWER CULDECAS OF EACH EYE NIGHTLY BEFORE BED, Disp: 3.5 g, Rfl: 10     omeprazole (PRILOSEC) 20 MG DR capsule, TAKE 1 CAPSULE (20 MG TOTAL) BY MOUTH DAILY BEFORE BREAKFAST., Disp: 30  capsule, Rfl: 0     QUEtiapine (SEROQUEL) 25 MG tablet, 1/2 TAB (12.5MG) BY MOUTH TWICE DAILY (1600 AND BEDTIME) (DX: PSYCHOSIS), Disp: 30 tablet, Rfl: 11     RHOPRESSA 0.02 % SOLN, , Disp: , Rfl:      simvastatin (ZOCOR) 40 MG tablet, TAKE 1 TABLET BY MOUTH EVERYDAY AT BEDTIME, Disp: 30 tablet, Rfl: 0    /68   Pulse 74   Temp 97.9  F (36.6  C)   Resp 18   Wt 71 kg (156 lb 9.6 oz)   LMP  (LMP Unknown)   SpO2 97%   BMI 28.87 kg/m      LABS:   TSH   Date Value Ref Range Status   10/13/2021 19.00 (H) 0.30 - 5.00 uIU/mL Final     Recent Labs   Lab Test 09/13/21  1201   CHOL 168   HDL 58   LDL 89   TRIG 106           ASSESSMENT:    Encounter Diagnoses   Name Primary?     Hypothyroidism, unspecified type Yes     Gastroesophageal reflux disease with esophagitis without hemorrhage      Senile dementia, uncomplicated (H)      Physical debility        PLAN:    Adding in Synthroid 50 mcg daily and rechecking her TSH again on November 15.  Otherwise no new changes of the Seroquel recently added 12.5 mg at 4 PM and again at 8 PM and the staff in charge nurse do feel to be effective for nighttime.  Continue to manage and follow.        Electronically signed by: Seth Rider NP

## 2021-10-15 NOTE — LETTER
10/15/2021        RE: Shalini Mann  [address] [place] [address]        M [facility] GERIATRIC SERVICES    Facility:   [facility] ([place]) [[id number]]   Code Status: DNR/DNI      CHIEF COMPLAINT/REASON FOR VISIT:  Chief Complaint   Patient presents with     RECHECK     asked to see, tsh, med mgmt       HISTORY:      HPI: Shalini is a 76 year old female Who I was asked to visit with secondary to recent TSH as well as medication management of chronic medical conditions.  Her TSH previously was 0.01 and she was on Synthroid 100 mcg and that was discontinued with a recheck of her TSH on October 13.  The TSH did come back at 19 and now we will put her on 50 mcg of Synthroid and recheck another TSH on November 15.  Had a pleasant visit with her as well.  She has a very nice woman and does have a lot of family pictures and she was able to share with me the family itself as well as various names and her prognosis of her family.  About 2 months ago it did end up adding Seroquel 12.5 mg at 4 PM and again at 8 PM secondary to evening time psychosis and restlessness and that has been helpful and the charge nurse does not feel we need to do anything suggesting.  There have been no recent vital signs performed.  She states that she has been in her normal state of health.  She did not have any colds or flus and overall feels pretty good and does like to participate in the activities.    No past medical history on file.         No family history on file.   Social History     Socioeconomic History     Marital status:      Spouse name: Not on file     Number of children: Not on file     Years of education: Not on file     Highest education level: Not on file   Occupational History     Not on file   Tobacco Use     Smoking status: Former Smoker     Smokeless tobacco: Never Used   Substance and Sexual Activity     Alcohol use: Not on file     Drug use: Not on file     Sexual activity: Not on file    Other Topics Concern     Not on file   Social History Narrative     Not on file     Social Determinants of Health     Financial Resource Strain:      Difficulty of Paying Living Expenses:    Food Insecurity:      Worried About Running Out of Food in the Last Year:      Ran Out of Food in the Last Year:    Transportation Needs:      Lack of Transportation (Medical):      Lack of Transportation (Non-Medical):    Physical Activity:      Days of Exercise per Week:      Minutes of Exercise per Session:    Stress:      Feeling of Stress :    Social Connections:      Frequency of Communication with Friends and Family:      Frequency of Social Gatherings with Friends and Family:      Attends Jainism Services:      Active Member of Clubs or Organizations:      Attends Club or Organization Meetings:      Marital Status:    Intimate Partner Violence:      Fear of Current or Ex-Partner:      Emotionally Abused:      Physically Abused:      Sexually Abused:         REVIEW OF SYSTEM:  According to staff there have been no reports of fever chills fatigue sore throat postnasal drip colds flus chills fever nausea vomiting diarrhea dysuria frequency urgency unusual myalgias or arthralgias.    PHYSICAL EXAM:   Pleasant female in no acute distress.  Head is normocephalic.  Neck is supple without adenopathy.  Lung sounds are clear throughout.  Cardiovascular S1-S2, regular rate and rhythm.  No lower extremity edema.  Gastrointestinal nontender and nondistended.  Musculoskeletal able to ambulate without any assistive device.  Denies pain to her major joints.  Cognitive has dementia.  Pleasant affect.     Current Outpatient Medications:      levothyroxine (SYNTHROID/LEVOTHROID) 100 MCG tablet, Take 50 mcg by mouth daily , Disp: , Rfl:      LUMIGAN 0.01 % SOLN ophthalmic solution, , Disp: , Rfl:      BEBETO 128 5 % ophthalmic ointment, APPLY TO LOWER CULDECAS OF EACH EYE NIGHTLY BEFORE BED, Disp: 3.5 g, Rfl: 10     omeprazole (PRILOSEC)  20 MG DR capsule, TAKE 1 CAPSULE (20 MG TOTAL) BY MOUTH DAILY BEFORE BREAKFAST., Disp: 30 capsule, Rfl: 0     QUEtiapine (SEROQUEL) 25 MG tablet, 1/2 TAB (12.5MG) BY MOUTH TWICE DAILY (1600 AND BEDTIME) (DX: PSYCHOSIS), Disp: 30 tablet, Rfl: 11     RHOPRESSA 0.02 % SOLN, , Disp: , Rfl:      simvastatin (ZOCOR) 40 MG tablet, TAKE 1 TABLET BY MOUTH EVERYDAY AT BEDTIME, Disp: 30 tablet, Rfl: 0    /68   Pulse 74   Temp 97.9  F (36.6  C)   Resp 18   Wt 71 kg (156 lb 9.6 oz)   LMP  (LMP Unknown)   SpO2 97%   BMI 28.87 kg/m      LABS:   TSH   Date Value Ref Range Status   10/13/2021 19.00 (H) 0.30 - 5.00 uIU/mL Final     Recent Labs   Lab Test 09/13/21  1201   CHOL 168   HDL 58   LDL 89   TRIG 106           ASSESSMENT:    Encounter Diagnoses   Name Primary?     Hypothyroidism, unspecified type Yes     Gastroesophageal reflux disease with esophagitis without hemorrhage      Senile dementia, uncomplicated (H)      Physical debility        PLAN:    Adding in Synthroid 50 mcg daily and rechecking her TSH again on November 15.  Otherwise no new changes of the Seroquel recently added 12.5 mg at 4 PM and again at 8 PM and the staff in charge nurse do feel to be effective for nighttime.  Continue to manage and follow.        Electronically signed by: Seth Rider NP          Sincerely,        Seth Rider NP

## 2021-10-21 DIAGNOSIS — E78.2 MIXED HYPERLIPIDEMIA: ICD-10-CM

## 2021-10-21 RX ORDER — SIMVASTATIN 40 MG
TABLET ORAL
Qty: 30 TABLET | Refills: 10 | Status: SHIPPED | OUTPATIENT
Start: 2021-10-21 | End: 2022-08-22

## 2021-10-21 NOTE — TELEPHONE ENCOUNTER
May refill non-narcotic medications as needed for patients in Assisted Living or equivalent care setting

## 2021-11-11 ENCOUNTER — LAB REQUISITION (OUTPATIENT)
Dept: LAB | Facility: CLINIC | Age: 76
End: 2021-11-11
Payer: MEDICARE

## 2021-11-11 DIAGNOSIS — F03.90 UNSPECIFIED DEMENTIA WITHOUT BEHAVIORAL DISTURBANCE: ICD-10-CM

## 2021-11-11 DIAGNOSIS — H40.003 PREGLAUCOMA, UNSPECIFIED, BILATERAL: ICD-10-CM

## 2021-11-11 DIAGNOSIS — K52.89 OTHER SPECIFIED NONINFECTIVE GASTROENTERITIS AND COLITIS: ICD-10-CM

## 2021-11-12 ENCOUNTER — LAB REQUISITION (OUTPATIENT)
Dept: LAB | Facility: CLINIC | Age: 76
End: 2021-11-12
Payer: MEDICARE

## 2021-11-12 DIAGNOSIS — E03.9 HYPOTHYROIDISM, UNSPECIFIED: ICD-10-CM

## 2021-11-15 LAB — TSH SERPL DL<=0.005 MIU/L-ACNC: 28.83 UIU/ML (ref 0.3–5)

## 2021-11-15 PROCEDURE — P9603 ONE-WAY ALLOW PRORATED MILES: HCPCS | Mod: ORL | Performed by: FAMILY MEDICINE

## 2021-11-15 PROCEDURE — 36415 COLL VENOUS BLD VENIPUNCTURE: CPT | Mod: ORL | Performed by: FAMILY MEDICINE

## 2021-11-15 PROCEDURE — 84443 ASSAY THYROID STIM HORMONE: CPT | Mod: ORL | Performed by: FAMILY MEDICINE

## 2021-12-17 ENCOUNTER — LAB REQUISITION (OUTPATIENT)
Dept: LAB | Facility: CLINIC | Age: 76
End: 2021-12-17
Payer: MEDICARE

## 2021-12-17 DIAGNOSIS — E03.9 HYPOTHYROIDISM, UNSPECIFIED: ICD-10-CM

## 2021-12-21 LAB — TSH SERPL DL<=0.005 MIU/L-ACNC: 1.39 UIU/ML (ref 0.3–5)

## 2021-12-21 PROCEDURE — P9604 ONE-WAY ALLOW PRORATED TRIP: HCPCS | Mod: ORL | Performed by: FAMILY MEDICINE

## 2021-12-21 PROCEDURE — 84443 ASSAY THYROID STIM HORMONE: CPT | Mod: ORL | Performed by: FAMILY MEDICINE

## 2021-12-21 PROCEDURE — 36415 COLL VENOUS BLD VENIPUNCTURE: CPT | Mod: ORL | Performed by: FAMILY MEDICINE

## 2021-12-30 DIAGNOSIS — H26.8 PSEUDOEXFOLIATION OF LENS CAPSULE: Primary | ICD-10-CM

## 2021-12-30 RX ORDER — BRIMONIDINE TARTRATE, TIMOLOL MALEATE 2; 5 MG/ML; MG/ML
SOLUTION/ DROPS OPHTHALMIC
Qty: 5 ML | Refills: 11 | Status: SHIPPED | OUTPATIENT
Start: 2021-12-30 | End: 2023-01-01

## 2021-12-30 RX ORDER — NETARSUDIL 0.2 MG/ML
SOLUTION/ DROPS OPHTHALMIC; TOPICAL
Qty: 2.5 ML | Refills: 11 | Status: SHIPPED | OUTPATIENT
Start: 2021-12-30

## 2021-12-30 RX ORDER — CARBOXYMETHYLCELLULOSE SODIUM 5 MG/ML
SOLUTION/ DROPS OPHTHALMIC
Qty: 15 ML | Refills: 11 | Status: SHIPPED | OUTPATIENT
Start: 2021-12-30 | End: 2023-01-01

## 2021-12-30 RX ORDER — BIMATOPROST 0.1 MG/ML
SOLUTION/ DROPS OPHTHALMIC
Qty: 2.5 ML | Refills: 11 | Status: SHIPPED | OUTPATIENT
Start: 2021-12-30 | End: 2023-01-01

## 2021-12-30 RX ORDER — HYPOC ACID/SOD HYPO/NACL/WATER 0.02 %
SPRAY, NON-AEROSOL (ML) TOPICAL
Qty: 59 ML | Refills: 11 | Status: SHIPPED | OUTPATIENT
Start: 2021-12-30 | End: 2023-01-01

## 2022-01-01 DIAGNOSIS — E03.9 HYPOTHYROIDISM, UNSPECIFIED TYPE: ICD-10-CM

## 2022-01-01 RX ORDER — QUETIAPINE FUMARATE 25 MG/1
TABLET, FILM COATED ORAL
Qty: 90 TABLET | Refills: 11
Start: 2022-01-01 | End: 2023-01-01

## 2022-01-01 RX ORDER — LEVOTHYROXINE SODIUM 88 UG/1
88 TABLET ORAL DAILY
Qty: 90 TABLET | Refills: 3 | Status: SHIPPED | OUTPATIENT
Start: 2022-01-01 | End: 2022-01-01

## 2022-01-01 RX ORDER — LEVOTHYROXINE SODIUM 88 UG/1
88 TABLET ORAL DAILY
Qty: 90 TABLET | Refills: 3 | Status: SHIPPED | OUTPATIENT
Start: 2022-01-01 | End: 2023-01-01

## 2022-01-10 ENCOUNTER — OFFICE VISIT (OUTPATIENT)
Dept: GERIATRICS | Facility: CLINIC | Age: 77
End: 2022-01-10
Payer: MEDICARE

## 2022-01-10 VITALS
HEART RATE: 74 BPM | WEIGHT: 156.8 LBS | BODY MASS INDEX: 28.91 KG/M2 | SYSTOLIC BLOOD PRESSURE: 155 MMHG | TEMPERATURE: 96.7 F | RESPIRATION RATE: 19 BRPM | DIASTOLIC BLOOD PRESSURE: 78 MMHG | OXYGEN SATURATION: 97 %

## 2022-01-10 DIAGNOSIS — E03.9 HYPOTHYROIDISM, UNSPECIFIED TYPE: Primary | ICD-10-CM

## 2022-01-10 DIAGNOSIS — K21.9 GASTROESOPHAGEAL REFLUX DISEASE WITHOUT ESOPHAGITIS: ICD-10-CM

## 2022-01-10 DIAGNOSIS — M17.10 PRIMARY LOCALIZED OSTEOARTHROSIS OF LOWER LEG, UNSPECIFIED LATERALITY: ICD-10-CM

## 2022-01-10 DIAGNOSIS — E78.5 HYPERLIPIDEMIA, UNSPECIFIED HYPERLIPIDEMIA TYPE: ICD-10-CM

## 2022-01-10 NOTE — PROGRESS NOTES
[facility] GERIATRIC SERVICES    Facility:   [facility] ([place]) [[id number]]   Code Status: DNR/DNI      CHIEF COMPLAINT/REASON FOR VISIT:  Chief Complaint   Patient presents with     FVP Care Coordination - Regulatory     regulatory       HISTORY:      HPI: Shalini is a 76 year old female Who currently lives in the memory care assisted living facility room 2006 and who I was asked to visit with secondary to quarterly review of chronic medical conditions.  Very delightful female.  She does like to participate in the group activities including video exercises.  She also has wonderful stories to tell.  For reflux currently on omeprazole.  Her last cholesterol was in September at 168 currently on Zocor 40 mg, her TSH in December was 1.39 currently on Synthroid 100 mcg.  For mood she is on Seroquel 12.5 mg twice daily no recent changes to her medications.  Is on a number of eyedrops for glaucoma.  Her last blood pressure January 3 was 155/78.  Denies discomfort.  Independent without any assistive device.  She claims her appetite to be good.    No past medical history on file.         No family history on file.   Social History     Socioeconomic History     Marital status:      Spouse name: Not on file     Number of children: Not on file     Years of education: Not on file     Highest education level: Not on file   Occupational History     Not on file   Tobacco Use     Smoking status: Former Smoker     Smokeless tobacco: Never Used   Substance and Sexual Activity     Alcohol use: Not on file     Drug use: Not on file     Sexual activity: Not on file   Other Topics Concern     Not on file   Social History Narrative     Not on file     Social Determinants of Health     Financial Resource Strain: Not on file   Food Insecurity: Not on file   Transportation Needs: Not on file   Physical Activity: Not on file   Stress: Not on file   Social Connections: Not on file   Intimate Partner Violence: Not on file    Housing Stability: Not on file        REVIEW OF SYSTEM:  According to staff there have been no reports of fever chills fatigue sore throat postnasal drip colds flus chills fever nausea vomiting diarrhea dysuria frequency urgency unusual myalgias or arthralgias.    PHYSICAL EXAM:   Pleasant female in no acute distress.  Head is normocephalic.  .  Lung sounds are clear throughout.  Cardiovascular S1-S2, regular rate and rhythm.  No lower extremity edema.  Gastrointestinal nontender and nondistended.  Musculoskeletal able to ambulate without any assistive device.  Denies pain to her major joints.  Cognitive has dementia.  Pleasant affect.         Current Outpatient Medications:      CARBOXYMETHYLCELLULOSE SODIUM 0.5 % SOLN ophthalmic solution, INSTILL 1 DROP IN BOTH EYES TWICE DAILY;USE AS DIRECTED FOUR TIMES DAILY AS NEEDED, Disp: 15 mL, Rfl: 11     COMBIGAN 0.2-0.5 % ophthalmic solution, INSTILL 1 DROP IN THE RIGHT EYE TWICE DAILY, Disp: 5 mL, Rfl: 11     Eyelid Cleansers (OCUSOFT HYPOCHLOR) SOLN, USE SMALL AMOUNT AS DIRECTED AFTER INSTILLING EYE DROPS AS ORDERED, Disp: 59 mL, Rfl: 11     levothyroxine (SYNTHROID/LEVOTHROID) 100 MCG tablet, Take 100 mcg by mouth daily , Disp: , Rfl:      LUMIGAN 0.01 % SOLN ophthalmic solution, INSTILL 1 DROP IN BOTH EYES AT BEDTIME, Disp: 2.5 mL, Rfl: 11     BEBETO 128 5 % ophthalmic ointment, APPLY TO LOWER CULDECAS OF EACH EYE NIGHTLY BEFORE BED, Disp: 3.5 g, Rfl: 10     omeprazole (PRILOSEC) 20 MG DR capsule, TAKE 1 CAPSULE (20 MG TOTAL) BY MOUTH DAILY BEFORE BREAKFAST., Disp: 30 capsule, Rfl: 0     QUEtiapine (SEROQUEL) 25 MG tablet, 1/2 TAB (12.5MG) BY MOUTH TWICE DAILY (1600 AND BEDTIME) (DX: PSYCHOSIS), Disp: 30 tablet, Rfl: 11     RHOPRESSA 0.02 % ophthalmic solution, INSTILL 1 DROP IN THE RIGHT EYE AT BEDTIME, Disp: 2.5 mL, Rfl: 11     simvastatin (ZOCOR) 40 MG tablet, 1 TAB BY MOUTH EVERY BEDTIME, Disp: 30 tablet, Rfl: 10    BP (!) 155/78   Pulse 74   Temp (!) 96.7  F  (35.9  C)   Resp 19   Wt 71.1 kg (156 lb 12.8 oz)   LMP  (LMP Unknown)   SpO2 97%   BMI 28.91 kg/m        LABS:   TSH   Date Value Ref Range Status   12/21/2021 1.39 0.30 - 5.00 uIU/mL Final     Recent Labs   Lab Test 09/13/21  1201   CHOL 168   HDL 58   LDL 89   TRIG 106         ASSESSMENT:    (E03.9) Hypothyroidism, unspecified type  (primary encounter diagnosis)  Comment: Continue with Synthroid 100 mcg  Plan: Continue to manage    (E78.5) Hyperlipidemia, unspecified hyperlipidemia type  Comment: Continue with Zocor 40 mg  Plan: Recheck her lipid panel sometime mid winter or early spring    (M17.10) Primary localized osteoarthrosis of lower leg, unspecified laterality  Comment: Currently not having any discomfort  Plan: Continue to manage.  Able to ambulate independently    (K21.9) Gastroesophageal reflux disease without esophagitis  Comment: Currently on omeprazole 20 mg  Plan: No complaints      Case Management:  I have reviewed the Assisted Living care plan, current immunizations and preventive care/cancer screening.. Future cancer screening is not clinically indicated secondary to age/goals of care.   Patient's desire to return to the community is not assessible due to cognitive impairment.    Information reviewed:  Medications, vital signs, orders, and nursing notes.  PLAN:    No medication changes.  No laboratory studies are necessary.  We can repeat them coming up later on this clinic to early spring.  Very delightful female.  Does have a good sense of humor and does like to participate in the group activities and charge nurse was also very helpful and did not have any problems or concerns with this patient.    Electronically signed by: Seth Rider NP

## 2022-01-10 NOTE — LETTER
1/10/2022        RE: Shalini Mann  [address]  [place] [address]          M [facility] GERIATRIC SERVICES    Facility:   [facility] ([place]) [[id number]]   Code Status: DNR/DNI      CHIEF COMPLAINT/REASON FOR VISIT:  Chief Complaint   Patient presents with     FVP Care Coordination - Regulatory     regulatory       HISTORY:      HPI: Shalini is a 76 year old female Who currently lives in the memory care assisted living facility room 2006 and who I was asked to visit with secondary to quarterly review of chronic medical conditions.  Very delightful female.  She does like to participate in the group activities including video exercises.  She also has wonderful stories to tell.  For reflux currently on omeprazole.  Her last cholesterol was in September at 168 currently on Zocor 40 mg, her TSH in December was 1.39 currently on Synthroid 100 mcg.  For mood she is on Seroquel 12.5 mg twice daily no recent changes to her medications.  Is on a number of eyedrops for glaucoma.  Her last blood pressure January 3 was 155/78.  Denies discomfort.  Independent without any assistive device.  She claims her appetite to be good.    No past medical history on file.         No family history on file.   Social History     Socioeconomic History     Marital status:      Spouse name: Not on file     Number of children: Not on file     Years of education: Not on file     Highest education level: Not on file   Occupational History     Not on file   Tobacco Use     Smoking status: Former Smoker     Smokeless tobacco: Never Used   Substance and Sexual Activity     Alcohol use: Not on file     Drug use: Not on file     Sexual activity: Not on file   Other Topics Concern     Not on file   Social History Narrative     Not on file     Social Determinants of Health     Financial Resource Strain: Not on file   Food Insecurity: Not on file   Transportation Needs: Not on file   Physical Activity: Not on file   Stress:  Not on file   Social Connections: Not on file   Intimate Partner Violence: Not on file   Housing Stability: Not on file        REVIEW OF SYSTEM:  According to staff there have been no reports of fever chills fatigue sore throat postnasal drip colds flus chills fever nausea vomiting diarrhea dysuria frequency urgency unusual myalgias or arthralgias.    PHYSICAL EXAM:   Pleasant female in no acute distress.  Head is normocephalic.  .  Lung sounds are clear throughout.  Cardiovascular S1-S2, regular rate and rhythm.  No lower extremity edema.  Gastrointestinal nontender and nondistended.  Musculoskeletal able to ambulate without any assistive device.  Denies pain to her major joints.  Cognitive has dementia.  Pleasant affect.         Current Outpatient Medications:      CARBOXYMETHYLCELLULOSE SODIUM 0.5 % SOLN ophthalmic solution, INSTILL 1 DROP IN BOTH EYES TWICE DAILY;USE AS DIRECTED FOUR TIMES DAILY AS NEEDED, Disp: 15 mL, Rfl: 11     COMBIGAN 0.2-0.5 % ophthalmic solution, INSTILL 1 DROP IN THE RIGHT EYE TWICE DAILY, Disp: 5 mL, Rfl: 11     Eyelid Cleansers (OCUSOFT HYPOCHLOR) SOLN, USE SMALL AMOUNT AS DIRECTED AFTER INSTILLING EYE DROPS AS ORDERED, Disp: 59 mL, Rfl: 11     levothyroxine (SYNTHROID/LEVOTHROID) 100 MCG tablet, Take 100 mcg by mouth daily , Disp: , Rfl:      LUMIGAN 0.01 % SOLN ophthalmic solution, INSTILL 1 DROP IN BOTH EYES AT BEDTIME, Disp: 2.5 mL, Rfl: 11     BEBETO 128 5 % ophthalmic ointment, APPLY TO LOWER CULDECAS OF EACH EYE NIGHTLY BEFORE BED, Disp: 3.5 g, Rfl: 10     omeprazole (PRILOSEC) 20 MG DR capsule, TAKE 1 CAPSULE (20 MG TOTAL) BY MOUTH DAILY BEFORE BREAKFAST., Disp: 30 capsule, Rfl: 0     QUEtiapine (SEROQUEL) 25 MG tablet, 1/2 TAB (12.5MG) BY MOUTH TWICE DAILY (1600 AND BEDTIME) (DX: PSYCHOSIS), Disp: 30 tablet, Rfl: 11     RHOPRESSA 0.02 % ophthalmic solution, INSTILL 1 DROP IN THE RIGHT EYE AT BEDTIME, Disp: 2.5 mL, Rfl: 11     simvastatin (ZOCOR) 40 MG tablet, 1 TAB BY MOUTH  EVERY BEDTIME, Disp: 30 tablet, Rfl: 10    BP (!) 155/78   Pulse 74   Temp (!) 96.7  F (35.9  C)   Resp 19   Wt 71.1 kg (156 lb 12.8 oz)   LMP  (LMP Unknown)   SpO2 97%   BMI 28.91 kg/m        LABS:   TSH   Date Value Ref Range Status   12/21/2021 1.39 0.30 - 5.00 uIU/mL Final     Recent Labs   Lab Test 09/13/21  1201   CHOL 168   HDL 58   LDL 89   TRIG 106         ASSESSMENT:    (E03.9) Hypothyroidism, unspecified type  (primary encounter diagnosis)  Comment: Continue with Synthroid 100 mcg  Plan: Continue to manage    (E78.5) Hyperlipidemia, unspecified hyperlipidemia type  Comment: Continue with Zocor 40 mg  Plan: Recheck her lipid panel sometime mid winter or early spring    (M17.10) Primary localized osteoarthrosis of lower leg, unspecified laterality  Comment: Currently not having any discomfort  Plan: Continue to manage.  Able to ambulate independently    (K21.9) Gastroesophageal reflux disease without esophagitis  Comment: Currently on omeprazole 20 mg  Plan: No complaints      Case Management:  I have reviewed the Assisted Living care plan, current immunizations and preventive care/cancer screening.. Future cancer screening is not clinically indicated secondary to age/goals of care.   Patient's desire to return to the community is not assessible due to cognitive impairment.    Information reviewed:  Medications, vital signs, orders, and nursing notes.  PLAN:    No medication changes.  No laboratory studies are necessary.  We can repeat them coming up later on this clinic to early spring.  Very delightful female.  Does have a good sense of humor and does like to participate in the group activities and charge nurse was also very helpful and did not have any problems or concerns with this patient.    Electronically signed by: Seth Rider NP            Sincerely,        Seth Rider NP

## 2022-03-22 DIAGNOSIS — F02.80 ALZHEIMER'S DEMENTIA WITHOUT BEHAVIORAL DISTURBANCE, UNSPECIFIED TIMING OF DEMENTIA ONSET: ICD-10-CM

## 2022-03-22 DIAGNOSIS — G30.9 ALZHEIMER'S DEMENTIA WITHOUT BEHAVIORAL DISTURBANCE, UNSPECIFIED TIMING OF DEMENTIA ONSET: ICD-10-CM

## 2022-03-22 RX ORDER — QUETIAPINE FUMARATE 25 MG/1
TABLET, FILM COATED ORAL
Qty: 30 TABLET | Refills: 10 | Status: SHIPPED | OUTPATIENT
Start: 2022-03-22 | End: 2022-06-02

## 2022-04-27 ENCOUNTER — ASSISTED LIVING VISIT (OUTPATIENT)
Dept: GERIATRICS | Facility: CLINIC | Age: 77
End: 2022-04-27
Payer: MEDICARE

## 2022-04-27 VITALS
RESPIRATION RATE: 19 BRPM | HEART RATE: 68 BPM | OXYGEN SATURATION: 98 % | BODY MASS INDEX: 29.5 KG/M2 | WEIGHT: 160 LBS | DIASTOLIC BLOOD PRESSURE: 96 MMHG | TEMPERATURE: 97 F | SYSTOLIC BLOOD PRESSURE: 126 MMHG

## 2022-04-27 DIAGNOSIS — K21.9 GASTROESOPHAGEAL REFLUX DISEASE WITHOUT ESOPHAGITIS: Primary | ICD-10-CM

## 2022-04-27 DIAGNOSIS — F09 MILD COGNITIVE DISORDER: ICD-10-CM

## 2022-04-27 DIAGNOSIS — E78.5 HYPERLIPIDEMIA, UNSPECIFIED HYPERLIPIDEMIA TYPE: ICD-10-CM

## 2022-04-27 DIAGNOSIS — E03.9 HYPOTHYROIDISM, UNSPECIFIED TYPE: ICD-10-CM

## 2022-04-27 NOTE — LETTER
4/27/2022        RE: Shalini Mann  [address]  [place] [address]        M [facility] GERIATRIC SERVICES    Facility:   [facility] ([place]) [[id number]]   Code Status: DNR/DNI      CHIEF COMPLAINT/REASON FOR VISIT:  Chief Complaint   Patient presents with     RECHECK       HISTORY:      HPI: Shalini is a 76 year old female who I was asked to visit with today and who does live in assisted living memory care unit room 2006 secondary to behaviors as well as her recent adjustment of her medication as well as going over her medications with the charge nurse as well as discussion of various medications.  She does live in the memory care assisted living facility.  She is polite.  She is a former .  She has been having some issues with moods and various behaviors and Seroquel was initiated it was eventually at 25 mg twice daily and now recently was increased to 3 times daily and the staff and charge nurse do feel that it has been helpful for her moods and she has been less restless and more cooperative with cares.  Also she has been on omeprazole and no history of GI bleed no complaints of any GERD so discontinue the omeprazole 20 mg.  She is on simvastatin 40 mg her last cholesterol in September was 168.  I prefer to discontinue the Zocor altogether we will wait for the cholesterol first and the charge nurse will get a hold of family.  She is also on Synthroid 100 mcg her TSH in December was 1.39 so we will recheck that as well next week on May 5.  Otherwise she does participate in the group activities including various exercises and various games.    No past medical history on file.         No family history on file.   Social History     Socioeconomic History     Marital status:    Tobacco Use     Smoking status: Former Smoker     Smokeless tobacco: Never Used        REVIEW OF SYSTEM:  According to staff there have been no reports of fever chills fatigue sore throat postnasal  drip colds flus chills fever nausea vomiting diarrhea dysuria frequency urgency unusual myalgias or arthralgias.    PHYSICAL EXAM:   Pleasant female in no acute distress.  Head is normocephalic.  .  Lung sounds are clear throughout.  Cardiovascular S1-S2, regular rate and rhythm.  No lower extremity edema.  Gastrointestinal - nondistended.  Musculoskeletal able to ambulate without any assistive device.  Denies pain to her major joints.  Cognitive has dementia.  Pleasant affect.      Current Outpatient Medications:      CARBOXYMETHYLCELLULOSE SODIUM 0.5 % SOLN ophthalmic solution, INSTILL 1 DROP IN BOTH EYES TWICE DAILY;USE AS DIRECTED FOUR TIMES DAILY AS NEEDED, Disp: 15 mL, Rfl: 11     COMBIGAN 0.2-0.5 % ophthalmic solution, INSTILL 1 DROP IN THE RIGHT EYE TWICE DAILY, Disp: 5 mL, Rfl: 11     Eyelid Cleansers (OCUSOFT HYPOCHLOR) SOLN, USE SMALL AMOUNT AS DIRECTED AFTER INSTILLING EYE DROPS AS ORDERED, Disp: 59 mL, Rfl: 11     levothyroxine (SYNTHROID/LEVOTHROID) 100 MCG tablet, Take 100 mcg by mouth daily , Disp: , Rfl:      LUMIGAN 0.01 % SOLN ophthalmic solution, INSTILL 1 DROP IN BOTH EYES AT BEDTIME, Disp: 2.5 mL, Rfl: 11     BEBETO 128 5 % ophthalmic ointment, APPLY TO LOWER CULDECAS OF EACH EYE NIGHTLY BEFORE BED, Disp: 3.5 g, Rfl: 10     QUEtiapine (SEROQUEL) 25 MG tablet, TAKE ONE-HALF TABLET (12.5 MG) BY MOUTH TWICE A DAY (Patient taking differently: Take 25 mg by mouth 3 times daily), Disp: 30 tablet, Rfl: 10     RHOPRESSA 0.02 % ophthalmic solution, INSTILL 1 DROP IN THE RIGHT EYE AT BEDTIME, Disp: 2.5 mL, Rfl: 11     simvastatin (ZOCOR) 40 MG tablet, 1 TAB BY MOUTH EVERY BEDTIME, Disp: 30 tablet, Rfl: 10      Current Outpatient Medications:      CARBOXYMETHYLCELLULOSE SODIUM 0.5 % SOLN ophthalmic solution, INSTILL 1 DROP IN BOTH EYES TWICE DAILY;USE AS DIRECTED FOUR TIMES DAILY AS NEEDED, Disp: 15 mL, Rfl: 11     COMBIGAN 0.2-0.5 % ophthalmic solution, INSTILL 1 DROP IN THE RIGHT EYE TWICE DAILY, Disp: 5  mL, Rfl: 11     Eyelid Cleansers (OCUSOFT HYPOCHLOR) SOLN, USE SMALL AMOUNT AS DIRECTED AFTER INSTILLING EYE DROPS AS ORDERED, Disp: 59 mL, Rfl: 11     levothyroxine (SYNTHROID/LEVOTHROID) 100 MCG tablet, Take 100 mcg by mouth daily , Disp: , Rfl:      LUMIGAN 0.01 % SOLN ophthalmic solution, INSTILL 1 DROP IN BOTH EYES AT BEDTIME, Disp: 2.5 mL, Rfl: 11     BEBETO 128 5 % ophthalmic ointment, APPLY TO LOWER CULDECAS OF EACH EYE NIGHTLY BEFORE BED, Disp: 3.5 g, Rfl: 10     QUEtiapine (SEROQUEL) 25 MG tablet, TAKE ONE-HALF TABLET (12.5 MG) BY MOUTH TWICE A DAY (Patient taking differently: Take 25 mg by mouth 3 times daily), Disp: 30 tablet, Rfl: 10     RHOPRESSA 0.02 % ophthalmic solution, INSTILL 1 DROP IN THE RIGHT EYE AT BEDTIME, Disp: 2.5 mL, Rfl: 11     simvastatin (ZOCOR) 40 MG tablet, 1 TAB BY MOUTH EVERY BEDTIME, Disp: 30 tablet, Rfl: 10       BP (!) 126/96   Pulse 68   Temp 97  F (36.1  C)   Resp 19   Wt 72.6 kg (160 lb)   LMP  (LMP Unknown)   SpO2 98%   BMI 29.50 kg/m      LABS:   TSH   Date Value Ref Range Status   12/21/2021 1.39 0.30 - 5.00 uIU/mL Final     Recent Labs   Lab Test 09/13/21  1201   CHOL 168   HDL 58   LDL 89   TRIG 106           ASSESSMENT:    Encounter Diagnoses   Name Primary?     Gastroesophageal reflux disease without esophagitis Yes     Hypothyroidism, unspecified type      Hyperlipidemia, unspecified hyperlipidemia type      Mild cognitive disorder        PLAN:    Discontinue the omeprazole 20 mg.  On May 3 get a TSH as well as a lipid panel.  I like to discontinue the simvastatin 40 mg.  In the meantime she has been normotensive with the last blood pressure check on April 3 at 126/96 she has been afebrile and also on room air.  Her moods have improved with the addition of Seroquel now at 25 mg 3 times daily.  Staff will keep me updated for any other problems or concerns.        Electronically signed by: Seth Rider NP          Sincerely,        Seth Rider NP

## 2022-04-27 NOTE — PROGRESS NOTES
[facility] GERIATRIC SERVICES    Facility:   [facility] ([place]) [[id number]]   Code Status: DNR/DNI      CHIEF COMPLAINT/REASON FOR VISIT:  Chief Complaint   Patient presents with     RECHECK       HISTORY:      HPI: Shalini is a 76 year old female who I was asked to visit with today and who does live in assisted living memory care unit room 2006 secondary to behaviors as well as her recent adjustment of her medication as well as going over her medications with the charge nurse as well as discussion of various medications.  She does live in the memory care assisted living facility.  She is polite.  She is a former .  She has been having some issues with moods and various behaviors and Seroquel was initiated it was eventually at 25 mg twice daily and now recently was increased to 3 times daily and the staff and charge nurse do feel that it has been helpful for her moods and she has been less restless and more cooperative with cares.  Also she has been on omeprazole and no history of GI bleed no complaints of any GERD so discontinue the omeprazole 20 mg.  She is on simvastatin 40 mg her last cholesterol in September was 168.  I prefer to discontinue the Zocor altogether we will wait for the cholesterol first and the charge nurse will get a hold of family.  She is also on Synthroid 100 mcg her TSH in December was 1.39 so we will recheck that as well next week on May 5.  Otherwise she does participate in the group activities including various exercises and various games.    No past medical history on file.         No family history on file.   Social History     Socioeconomic History     Marital status:    Tobacco Use     Smoking status: Former Smoker     Smokeless tobacco: Never Used        REVIEW OF SYSTEM:  According to staff there have been no reports of fever chills fatigue sore throat postnasal drip colds flus chills fever nausea vomiting diarrhea dysuria frequency urgency unusual  myalgias or arthralgias.    PHYSICAL EXAM:   Pleasant female in no acute distress.  Head is normocephalic.  .  Lung sounds are clear throughout.  Cardiovascular S1-S2, regular rate and rhythm.  No lower extremity edema.  Gastrointestinal - nondistended.  Musculoskeletal able to ambulate without any assistive device.  Denies pain to her major joints.  Cognitive has dementia.  Pleasant affect.      Current Outpatient Medications:      CARBOXYMETHYLCELLULOSE SODIUM 0.5 % SOLN ophthalmic solution, INSTILL 1 DROP IN BOTH EYES TWICE DAILY;USE AS DIRECTED FOUR TIMES DAILY AS NEEDED, Disp: 15 mL, Rfl: 11     COMBIGAN 0.2-0.5 % ophthalmic solution, INSTILL 1 DROP IN THE RIGHT EYE TWICE DAILY, Disp: 5 mL, Rfl: 11     Eyelid Cleansers (OCUSOFT HYPOCHLOR) SOLN, USE SMALL AMOUNT AS DIRECTED AFTER INSTILLING EYE DROPS AS ORDERED, Disp: 59 mL, Rfl: 11     levothyroxine (SYNTHROID/LEVOTHROID) 100 MCG tablet, Take 100 mcg by mouth daily , Disp: , Rfl:      LUMIGAN 0.01 % SOLN ophthalmic solution, INSTILL 1 DROP IN BOTH EYES AT BEDTIME, Disp: 2.5 mL, Rfl: 11     BEBETO 128 5 % ophthalmic ointment, APPLY TO LOWER CULDECAS OF EACH EYE NIGHTLY BEFORE BED, Disp: 3.5 g, Rfl: 10     QUEtiapine (SEROQUEL) 25 MG tablet, TAKE ONE-HALF TABLET (12.5 MG) BY MOUTH TWICE A DAY (Patient taking differently: Take 25 mg by mouth 3 times daily), Disp: 30 tablet, Rfl: 10     RHOPRESSA 0.02 % ophthalmic solution, INSTILL 1 DROP IN THE RIGHT EYE AT BEDTIME, Disp: 2.5 mL, Rfl: 11     simvastatin (ZOCOR) 40 MG tablet, 1 TAB BY MOUTH EVERY BEDTIME, Disp: 30 tablet, Rfl: 10      Current Outpatient Medications:      CARBOXYMETHYLCELLULOSE SODIUM 0.5 % SOLN ophthalmic solution, INSTILL 1 DROP IN BOTH EYES TWICE DAILY;USE AS DIRECTED FOUR TIMES DAILY AS NEEDED, Disp: 15 mL, Rfl: 11     COMBIGAN 0.2-0.5 % ophthalmic solution, INSTILL 1 DROP IN THE RIGHT EYE TWICE DAILY, Disp: 5 mL, Rfl: 11     Eyelid Cleansers (OCUSOFT HYPOCHLOR) SOLN, USE SMALL AMOUNT AS DIRECTED  AFTER INSTILLING EYE DROPS AS ORDERED, Disp: 59 mL, Rfl: 11     levothyroxine (SYNTHROID/LEVOTHROID) 100 MCG tablet, Take 100 mcg by mouth daily , Disp: , Rfl:      LUMIGAN 0.01 % SOLN ophthalmic solution, INSTILL 1 DROP IN BOTH EYES AT BEDTIME, Disp: 2.5 mL, Rfl: 11     BEBETO 128 5 % ophthalmic ointment, APPLY TO LOWER CULDECAS OF EACH EYE NIGHTLY BEFORE BED, Disp: 3.5 g, Rfl: 10     QUEtiapine (SEROQUEL) 25 MG tablet, TAKE ONE-HALF TABLET (12.5 MG) BY MOUTH TWICE A DAY (Patient taking differently: Take 25 mg by mouth 3 times daily), Disp: 30 tablet, Rfl: 10     RHOPRESSA 0.02 % ophthalmic solution, INSTILL 1 DROP IN THE RIGHT EYE AT BEDTIME, Disp: 2.5 mL, Rfl: 11     simvastatin (ZOCOR) 40 MG tablet, 1 TAB BY MOUTH EVERY BEDTIME, Disp: 30 tablet, Rfl: 10       BP (!) 126/96   Pulse 68   Temp 97  F (36.1  C)   Resp 19   Wt 72.6 kg (160 lb)   LMP  (LMP Unknown)   SpO2 98%   BMI 29.50 kg/m      LABS:   TSH   Date Value Ref Range Status   12/21/2021 1.39 0.30 - 5.00 uIU/mL Final     Recent Labs   Lab Test 09/13/21  1201   CHOL 168   HDL 58   LDL 89   TRIG 106           ASSESSMENT:    Encounter Diagnoses   Name Primary?     Gastroesophageal reflux disease without esophagitis Yes     Hypothyroidism, unspecified type      Hyperlipidemia, unspecified hyperlipidemia type      Mild cognitive disorder        PLAN:    Discontinue the omeprazole 20 mg.  On May 3 get a TSH as well as a lipid panel.  I like to discontinue the simvastatin 40 mg.  In the meantime she has been normotensive with the last blood pressure check on April 3 at 126/96 she has been afebrile and also on room air.  Her moods have improved with the addition of Seroquel now at 25 mg 3 times daily.  Staff will keep me updated for any other problems or concerns.        Electronically signed by: Seth Rider NP

## 2022-05-06 ENCOUNTER — LAB REQUISITION (OUTPATIENT)
Dept: LAB | Facility: CLINIC | Age: 77
End: 2022-05-06
Payer: MEDICARE

## 2022-05-06 DIAGNOSIS — E03.8 OTHER SPECIFIED HYPOTHYROIDISM: ICD-10-CM

## 2022-05-09 LAB
CHOLEST SERPL-MCNC: 176 MG/DL
FASTING STATUS PATIENT QL REPORTED: ABNORMAL
HDLC SERPL-MCNC: 46 MG/DL
LDLC SERPL CALC-MCNC: 100 MG/DL
TRIGL SERPL-MCNC: 151 MG/DL
TSH SERPL DL<=0.005 MIU/L-ACNC: 0.02 UIU/ML (ref 0.3–5)

## 2022-05-09 PROCEDURE — 84443 ASSAY THYROID STIM HORMONE: CPT | Mod: ORL | Performed by: FAMILY MEDICINE

## 2022-05-09 PROCEDURE — 36415 COLL VENOUS BLD VENIPUNCTURE: CPT | Mod: ORL | Performed by: FAMILY MEDICINE

## 2022-05-09 PROCEDURE — P9603 ONE-WAY ALLOW PRORATED MILES: HCPCS | Mod: ORL | Performed by: FAMILY MEDICINE

## 2022-05-09 PROCEDURE — 80061 LIPID PANEL: CPT | Mod: ORL | Performed by: FAMILY MEDICINE

## 2022-05-12 DIAGNOSIS — E03.9 HYPOTHYROIDISM, UNSPECIFIED TYPE: Primary | ICD-10-CM

## 2022-05-12 RX ORDER — LEVOTHYROXINE SODIUM 88 UG/1
88 TABLET ORAL DAILY
Qty: 90 TABLET | Refills: 3 | Status: SHIPPED | OUTPATIENT
Start: 2022-05-12 | End: 2022-07-18 | Stop reason: DRUGHIGH

## 2022-05-12 RX ORDER — LEVOTHYROXINE SODIUM 88 UG/1
88 TABLET ORAL DAILY
COMMUNITY
End: 2022-05-12

## 2022-06-02 DIAGNOSIS — F02.80 ALZHEIMER'S DEMENTIA WITHOUT BEHAVIORAL DISTURBANCE, UNSPECIFIED TIMING OF DEMENTIA ONSET: ICD-10-CM

## 2022-06-02 DIAGNOSIS — G30.9 ALZHEIMER'S DEMENTIA WITHOUT BEHAVIORAL DISTURBANCE, UNSPECIFIED TIMING OF DEMENTIA ONSET: ICD-10-CM

## 2022-06-02 RX ORDER — QUETIAPINE FUMARATE 25 MG/1
TABLET, FILM COATED ORAL
Qty: 90 TABLET | Refills: 11 | Status: SHIPPED | OUTPATIENT
Start: 2022-06-02 | End: 2022-07-05

## 2022-06-12 ENCOUNTER — LAB REQUISITION (OUTPATIENT)
Dept: LAB | Facility: CLINIC | Age: 77
End: 2022-06-12
Payer: MEDICARE

## 2022-06-12 DIAGNOSIS — E03.8 OTHER SPECIFIED HYPOTHYROIDISM: ICD-10-CM

## 2022-06-14 LAB — TSH SERPL DL<=0.005 MIU/L-ACNC: 0.06 UIU/ML (ref 0.3–5)

## 2022-06-14 PROCEDURE — 84443 ASSAY THYROID STIM HORMONE: CPT | Mod: ORL | Performed by: FAMILY MEDICINE

## 2022-06-14 PROCEDURE — P9604 ONE-WAY ALLOW PRORATED TRIP: HCPCS | Mod: ORL | Performed by: FAMILY MEDICINE

## 2022-06-14 PROCEDURE — 36415 COLL VENOUS BLD VENIPUNCTURE: CPT | Mod: ORL | Performed by: FAMILY MEDICINE

## 2022-07-05 DIAGNOSIS — G30.9 ALZHEIMER'S DEMENTIA WITHOUT BEHAVIORAL DISTURBANCE, UNSPECIFIED TIMING OF DEMENTIA ONSET: ICD-10-CM

## 2022-07-05 DIAGNOSIS — F02.80 ALZHEIMER'S DEMENTIA WITHOUT BEHAVIORAL DISTURBANCE, UNSPECIFIED TIMING OF DEMENTIA ONSET: ICD-10-CM

## 2022-07-06 RX ORDER — QUETIAPINE FUMARATE 25 MG/1
TABLET, FILM COATED ORAL
Qty: 90 TABLET | Refills: 11 | Status: SHIPPED | OUTPATIENT
Start: 2022-07-06 | End: 2022-01-01

## 2022-07-11 ENCOUNTER — ASSISTED LIVING VISIT (OUTPATIENT)
Dept: GERIATRICS | Facility: CLINIC | Age: 77
End: 2022-07-11
Payer: MEDICARE

## 2022-07-11 VITALS
DIASTOLIC BLOOD PRESSURE: 70 MMHG | RESPIRATION RATE: 19 BRPM | WEIGHT: 112 LBS | HEART RATE: 76 BPM | BODY MASS INDEX: 20.65 KG/M2 | OXYGEN SATURATION: 95 % | SYSTOLIC BLOOD PRESSURE: 124 MMHG | TEMPERATURE: 97.2 F

## 2022-07-11 DIAGNOSIS — K21.9 GASTROESOPHAGEAL REFLUX DISEASE WITHOUT ESOPHAGITIS: ICD-10-CM

## 2022-07-11 DIAGNOSIS — E03.9 HYPOTHYROIDISM, UNSPECIFIED TYPE: Primary | ICD-10-CM

## 2022-07-11 DIAGNOSIS — F03.90 SENILE DEMENTIA, UNCOMPLICATED (H): ICD-10-CM

## 2022-07-11 DIAGNOSIS — E78.5 DYSLIPIDEMIA: ICD-10-CM

## 2022-07-11 NOTE — PROGRESS NOTES
[facility] GERIATRIC SERVICES    Facility:   [facility] ([place]) [[id number]]   Code Status: DNR/DNI      CHIEF COMPLAINT/REASON FOR VISIT:  Chief Complaint   Patient presents with     FVP Care Coordination - Regulatory       HISTORY:      HPI: Shalini is a 76 year old female who currently does reside in the assisted living facility memory care unit room 2006 and who I was asked to visit with secondary to a regulatory or quarterly review of chronic medical conditions.  There are no new nursing notes.  Her last blood pressure on July 3 was 135/80 her weight 183 pounds.  I did have a chance to greet her sister as well today.  No heartburn or reflux.  Appetite is good.  She does participate in the various group activities.  Also managing her TSH she is currently on Synthroid 88 mcg I believe she has a TSH coming up soon is also on Zocor 40 mg daily is also on multivitamins and minerals.  For behaviors is on Seroquel 25 mg 3 times daily which has been helpful for her overall demeanor.    No past medical history on file.         No family history on file.   Social History     Socioeconomic History     Marital status:    Tobacco Use     Smoking status: Former Smoker     Smokeless tobacco: Never Used        REVIEW OF SYSTEM:  According to staff there have been no reports of fever chills fatigue sore throat postnasal drip colds flus chills fever nausea vomiting diarrhea dysuria frequency urgency unusual myalgias or arthralgias.  History of hypothyroidism    PHYSICAL EXAM:   Pleasant female in no acute distress.  Head is normocephalic.  .  Lung sounds are clear throughout.  Cardiovascular S1-S2, regular rate and rhythm.  No lower extremity edema.  Gastrointestinal - nondistended.  Musculoskeletal able to ambulate without any assistive device.  Denies pain to her major joints.  Cognitive has dementia.  Pleasant affect.    Current Outpatient Medications:      CARBOXYMETHYLCELLULOSE SODIUM 0.5 % SOLN ophthalmic  solution, INSTILL 1 DROP IN BOTH EYES TWICE DAILY;USE AS DIRECTED FOUR TIMES DAILY AS NEEDED, Disp: 15 mL, Rfl: 11     COMBIGAN 0.2-0.5 % ophthalmic solution, INSTILL 1 DROP IN THE RIGHT EYE TWICE DAILY, Disp: 5 mL, Rfl: 11     Eyelid Cleansers (OCUSOFT HYPOCHLOR) SOLN, USE SMALL AMOUNT AS DIRECTED AFTER INSTILLING EYE DROPS AS ORDERED, Disp: 59 mL, Rfl: 11     levothyroxine (SYNTHROID/LEVOTHROID) 88 MCG tablet, Take 1 tablet (88 mcg) by mouth daily, Disp: 90 tablet, Rfl: 3     LUMIGAN 0.01 % SOLN ophthalmic solution, INSTILL 1 DROP IN BOTH EYES AT BEDTIME, Disp: 2.5 mL, Rfl: 11     BEBETO 128 5 % ophthalmic ointment, APPLY TO LOWER CULDECAS OF EACH EYE NIGHTLY BEFORE BED, Disp: 3.5 g, Rfl: 10     QUEtiapine (SEROQUEL) 25 MG tablet, TAKE 1 TABLET BY MOUTH THREE TIMES A DAY, Disp: 90 tablet, Rfl: 11     RHOPRESSA 0.02 % ophthalmic solution, INSTILL 1 DROP IN THE RIGHT EYE AT BEDTIME, Disp: 2.5 mL, Rfl: 11     simvastatin (ZOCOR) 40 MG tablet, 1 TAB BY MOUTH EVERY BEDTIME, Disp: 30 tablet, Rfl: 10    /70   Pulse 76   Temp 97.2  F (36.2  C)   Resp 19   Wt 50.8 kg (112 lb)   LMP  (LMP Unknown)   SpO2 95%   BMI 20.65 kg/m        LABS:   TSH   Date Value Ref Range Status   06/14/2022 0.06 (L) 0.30 - 5.00 uIU/mL Final     Recent Labs   Lab Test 05/09/22  0835 09/13/21  1201   CHOL 176 168   HDL 46* 58    89   TRIG 151* 106         ASSESSMENT:    (E03.9) Hypothyroidism, unspecified type  (primary encounter diagnosis)  Comment: The Synthroid was recently adjusted and she does have a TSH coming up  Plan: Continue to monitor and follow    (E78.5) Dyslipidemia  Comment: They would like to continue with Zocor  Plan: Continue with Zocor    (K21.9) Gastroesophageal reflux disease without esophagitis  Comment: No issues  Plan: Continue to manage and follow    (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 Future cancer screening is not clinically indicated secondary to age/goals of care.   Patient's desire to return to the community is not assessible due to cognitive impairment.    Information reviewed:  Medications, vital signs, orders, and nursing notes.  PLAN:    There is an upcoming TSH.  Otherwise continue to manage and follow her other multiple chronic medical conditions.  Otherwise seems to be doing much better with the Seroquel schedule III times daily.    Electronically signed by: Seth Rider NP

## 2022-07-11 NOTE — LETTER
7/11/2022        RE: Shalini Mann  [address]  [place] [address]          [facility] GERIATRIC SERVICES    Facility:   [facility] ([place]) [[id number]]   Code Status: DNR/DNI      CHIEF COMPLAINT/REASON FOR VISIT:  Chief Complaint   Patient presents with     FVP Care Coordination - Regulatory       HISTORY:      HPI: Shalini is a 76 year old female who currently does reside in the assisted living facility memory care unit room 2006 and who I was asked to visit with secondary to a regulatory or quarterly review of chronic medical conditions.  There are no new nursing notes.  Her last blood pressure on July 3 was 135/80 her weight 183 pounds.  I did have a chance to greet her sister as well today.  No heartburn or reflux.  Appetite is good.  She does participate in the various group activities.  Also managing her TSH she is currently on Synthroid 88 mcg I believe she has a TSH coming up soon is also on Zocor 40 mg daily is also on multivitamins and minerals.  For behaviors is on Seroquel 25 mg 3 times daily which has been helpful for her overall demeanor.    No past medical history on file.         No family history on file.   Social History     Socioeconomic History     Marital status:    Tobacco Use     Smoking status: Former Smoker     Smokeless tobacco: Never Used        REVIEW OF SYSTEM:  According to staff there have been no reports of fever chills fatigue sore throat postnasal drip colds flus chills fever nausea vomiting diarrhea dysuria frequency urgency unusual myalgias or arthralgias.  History of hypothyroidism    PHYSICAL EXAM:   Pleasant female in no acute distress.  Head is normocephalic.  .  Lung sounds are clear throughout.  Cardiovascular S1-S2, regular rate and rhythm.  No lower extremity edema.  Gastrointestinal - nondistended.  Musculoskeletal able to ambulate without any assistive device.  Denies pain to her major joints.  Cognitive has dementia.  Pleasant  affect.    Current Outpatient Medications:      CARBOXYMETHYLCELLULOSE SODIUM 0.5 % SOLN ophthalmic solution, INSTILL 1 DROP IN BOTH EYES TWICE DAILY;USE AS DIRECTED FOUR TIMES DAILY AS NEEDED, Disp: 15 mL, Rfl: 11     COMBIGAN 0.2-0.5 % ophthalmic solution, INSTILL 1 DROP IN THE RIGHT EYE TWICE DAILY, Disp: 5 mL, Rfl: 11     Eyelid Cleansers (OCUSOFT HYPOCHLOR) SOLN, USE SMALL AMOUNT AS DIRECTED AFTER INSTILLING EYE DROPS AS ORDERED, Disp: 59 mL, Rfl: 11     levothyroxine (SYNTHROID/LEVOTHROID) 88 MCG tablet, Take 1 tablet (88 mcg) by mouth daily, Disp: 90 tablet, Rfl: 3     LUMIGAN 0.01 % SOLN ophthalmic solution, INSTILL 1 DROP IN BOTH EYES AT BEDTIME, Disp: 2.5 mL, Rfl: 11     BEBETO 128 5 % ophthalmic ointment, APPLY TO LOWER CULDECAS OF EACH EYE NIGHTLY BEFORE BED, Disp: 3.5 g, Rfl: 10     QUEtiapine (SEROQUEL) 25 MG tablet, TAKE 1 TABLET BY MOUTH THREE TIMES A DAY, Disp: 90 tablet, Rfl: 11     RHOPRESSA 0.02 % ophthalmic solution, INSTILL 1 DROP IN THE RIGHT EYE AT BEDTIME, Disp: 2.5 mL, Rfl: 11     simvastatin (ZOCOR) 40 MG tablet, 1 TAB BY MOUTH EVERY BEDTIME, Disp: 30 tablet, Rfl: 10    /70   Pulse 76   Temp 97.2  F (36.2  C)   Resp 19   Wt 50.8 kg (112 lb)   LMP  (LMP Unknown)   SpO2 95%   BMI 20.65 kg/m        LABS:   TSH   Date Value Ref Range Status   06/14/2022 0.06 (L) 0.30 - 5.00 uIU/mL Final     Recent Labs   Lab Test 05/09/22  0835 09/13/21  1201   CHOL 176 168   HDL 46* 58    89   TRIG 151* 106         ASSESSMENT:    (E03.9) Hypothyroidism, unspecified type  (primary encounter diagnosis)  Comment: The Synthroid was recently adjusted and she does have a TSH coming up  Plan: Continue to monitor and follow    (E78.5) Dyslipidemia  Comment: They would like to continue with Zocor  Plan: Continue with Zocor    (K21.9) Gastroesophageal reflux disease without esophagitis  Comment: No issues  Plan: Continue to manage and follow    (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 Future cancer screening is not clinically indicated secondary to age/goals of care.   Patient's desire to return to the community is not assessible due to cognitive impairment.    Information reviewed:  Medications, vital signs, orders, and nursing notes.  PLAN:    There is an upcoming TSH.  Otherwise continue to manage and follow her other multiple chronic medical conditions.  Otherwise seems to be doing much better with the Seroquel schedule III times daily.    Electronically signed by: Seth Rider NP            Sincerely,        Seth Rider NP

## 2022-07-13 ENCOUNTER — LAB REQUISITION (OUTPATIENT)
Dept: LAB | Facility: CLINIC | Age: 77
End: 2022-07-13
Payer: MEDICARE

## 2022-07-13 DIAGNOSIS — E03.9 HYPOTHYROIDISM, UNSPECIFIED: ICD-10-CM

## 2022-07-15 LAB — TSH SERPL DL<=0.005 MIU/L-ACNC: 6.2 UIU/ML (ref 0.3–4.2)

## 2022-07-15 PROCEDURE — P9604 ONE-WAY ALLOW PRORATED TRIP: HCPCS | Mod: ORL | Performed by: FAMILY MEDICINE

## 2022-07-15 PROCEDURE — 36415 COLL VENOUS BLD VENIPUNCTURE: CPT | Mod: ORL | Performed by: FAMILY MEDICINE

## 2022-07-15 PROCEDURE — 84443 ASSAY THYROID STIM HORMONE: CPT | Mod: ORL | Performed by: FAMILY MEDICINE

## 2022-07-18 ENCOUNTER — TELEPHONE (OUTPATIENT)
Dept: GERIATRICS | Facility: CLINIC | Age: 77
End: 2022-07-18

## 2022-07-18 RX ORDER — LEVOTHYROXINE SODIUM 75 UG/1
75 TABLET ORAL DAILY
COMMUNITY
Start: 2022-07-18 | End: 2022-07-25 | Stop reason: DRUGHIGH

## 2022-07-18 NOTE — TELEPHONE ENCOUNTER
[facility] Geriatrics Triage Nurse Telephone Encounter    Provider: LAMONTE Larsen  Facility: [facility] Type:  [place]    Caller: Fifi  Call Back Number: [phone]    Allergies:    Allergies   Allergen Reactions     Other Environmental Allergy Rash        Reason for call: Nurse called to report TSH level of 6.20.  Patient is currently on Levothyroxine 88 mcg po daily.      Verbal Order/Direction given by Provider: Decrease Levothyroxine to 75 mcg po daily.      Provider giving Order:  LAMONTE Larsen    Verbal Order given to: Fifi Sharp RN

## 2022-07-25 DIAGNOSIS — E03.9 HYPOTHYROIDISM, UNSPECIFIED TYPE: Primary | ICD-10-CM

## 2022-07-25 RX ORDER — LEVOTHYROXINE SODIUM 100 UG/1
100 TABLET ORAL DAILY
Qty: 30 TABLET | Refills: 0 | Status: SHIPPED | OUTPATIENT
Start: 2022-07-25 | End: 2022-08-22

## 2022-08-10 DIAGNOSIS — E03.9 HYPOTHYROIDISM, UNSPECIFIED TYPE: Primary | ICD-10-CM

## 2022-08-10 RX ORDER — LEVOTHYROXINE SODIUM 75 UG/1
TABLET ORAL
Qty: 30 TABLET | Refills: 11 | Status: SHIPPED | OUTPATIENT
Start: 2022-08-10 | End: 2022-08-22 | Stop reason: DRUGHIGH

## 2022-08-12 ENCOUNTER — LAB REQUISITION (OUTPATIENT)
Dept: LAB | Facility: CLINIC | Age: 77
End: 2022-08-12
Payer: MEDICARE

## 2022-08-12 DIAGNOSIS — E03.8 OTHER SPECIFIED HYPOTHYROIDISM: ICD-10-CM

## 2022-08-15 LAB — TSH SERPL DL<=0.005 MIU/L-ACNC: 0.95 UIU/ML (ref 0.3–4.2)

## 2022-08-15 PROCEDURE — 84443 ASSAY THYROID STIM HORMONE: CPT | Mod: ORL | Performed by: FAMILY MEDICINE

## 2022-08-15 PROCEDURE — P9604 ONE-WAY ALLOW PRORATED TRIP: HCPCS | Mod: ORL | Performed by: FAMILY MEDICINE

## 2022-08-15 PROCEDURE — 36415 COLL VENOUS BLD VENIPUNCTURE: CPT | Mod: ORL | Performed by: FAMILY MEDICINE

## 2022-08-22 DIAGNOSIS — E78.2 MIXED HYPERLIPIDEMIA: ICD-10-CM

## 2022-08-22 DIAGNOSIS — E03.9 HYPOTHYROIDISM, UNSPECIFIED TYPE: ICD-10-CM

## 2022-08-22 RX ORDER — SIMVASTATIN 40 MG
TABLET ORAL
Qty: 90 TABLET | Refills: 3 | Status: SHIPPED | OUTPATIENT
Start: 2022-08-22 | End: 2023-01-01

## 2022-08-22 RX ORDER — LEVOTHYROXINE SODIUM 100 UG/1
100 TABLET ORAL DAILY
Qty: 31 TABLET | Refills: 11 | Status: SHIPPED | OUTPATIENT
Start: 2022-08-22 | End: 2022-01-01 | Stop reason: DRUGHIGH

## 2022-11-08 ENCOUNTER — ASSISTED LIVING VISIT (OUTPATIENT)
Dept: GERIATRICS | Facility: CLINIC | Age: 77
End: 2022-11-08
Payer: MEDICARE

## 2022-11-08 VITALS
SYSTOLIC BLOOD PRESSURE: 125 MMHG | DIASTOLIC BLOOD PRESSURE: 78 MMHG | HEART RATE: 76 BPM | WEIGHT: 186.8 LBS | RESPIRATION RATE: 14 BRPM | OXYGEN SATURATION: 96 % | HEIGHT: 61 IN | BODY MASS INDEX: 35.27 KG/M2 | TEMPERATURE: 98 F

## 2022-11-08 DIAGNOSIS — R05.1 ACUTE COUGH: ICD-10-CM

## 2022-11-08 DIAGNOSIS — E66.01 MORBID OBESITY (H): Primary | ICD-10-CM

## 2022-11-08 DIAGNOSIS — R50.9 FEVER IN ADULT: ICD-10-CM

## 2022-11-08 PROCEDURE — U0005 INFEC AGEN DETEC AMPLI PROBE: HCPCS | Mod: ORL | Performed by: NURSE PRACTITIONER

## 2022-11-08 PROCEDURE — 87804 INFLUENZA ASSAY W/OPTIC: CPT | Mod: ORL | Performed by: NURSE PRACTITIONER

## 2022-11-08 RX ORDER — ACETAMINOPHEN 500 MG
TABLET ORAL
Start: 2022-11-08 | End: 2023-01-01

## 2022-11-08 NOTE — LETTER
11/8/2022        RE: Shalini Mann  [address] [place] [address]        [facility] GERIATRICS    Chief Complaint   Patient presents with     RECHECK     fever     HPI:  Shalini Mann is a 77 year old  (1945), who is being seen today for an episodic care visit at: No question data found..     Today's concern is:   Cough/Fever: RN reported patient has a fever of 100.6 this morning. Has cough, especially with deep breathing. Up resting on couch upon exam sleeping, staff reports this is uncharacteristic of her baseline. Denies feeling ill, weak or sick. Did state she was fatigued. Rapid swab negative for covid-19. Vital signs are otherwise within normal limits. More resistant to cares and anxious.     BP Readings from Last 3 Encounters:   11/08/22 125/78   07/11/22 124/70   04/27/22 (!) 126/96     Pulse Readings from Last 4 Encounters:   11/08/22 76   07/11/22 76   04/27/22 68   01/10/22 74     Wt Readings from Last 4 Encounters:   11/08/22 84.7 kg (186 lb 12.8 oz)   07/11/22 50.8 kg (112 lb)   04/27/22 72.6 kg (160 lb)   01/10/22 71.1 kg (156 lb 12.8 oz)           Allergies, and PMH/PSH reviewed in [facility] today.  REVIEW OF SYSTEMS:  4 point ROS including Respiratory, CV, GI and , other than that noted in the HPI,  is negative    Objective:   A & O x 3, NAD. Lungs CTA, non labored. RRR, S1/S2 w/o murmur,gallop or rub.  edema.  Abdomen soft, nontender, +BT'S. No focal neurological deficits.  alert and oriented with confusion. +cough.       Labs done in SNF are in [facility]. Please refer to them using [facility]/Care Everywhere. and Recent labs in [facility] reviewed by me today.     Assessment/Plan:  (E66.01) Morbid obesity (H)  (primary encounter diagnosis)  Comment:   Body mass index is 35.3 kg/m .  Plan:   -Encourage healthy meals and light exercise as tolerated.     (R50.9) Fever in adult  (R05.1) Acute cough  Comment: Acute, febrile with dry cough. Respiratory status stable. Rapid  Covid-19 negative   Plan:   -Acetaminophen 1000 mg PO BID and Acetaminophen 1000 mg PO Daily PRN  -Influenza swab   -UA/UC  -Rapid swab      MED REC REQUIREDs2  Post Medication Reconciliation Status: patient was not discharged from an inpatient facility or TCU      Orders:    Acetaminophen 1000 mg PO TID and acetaminophen 1000 mg PO daily PRN    Rapid covid-19 swab    UA/UC    Influenza PCR    Electronically signed by:   MEKA Rivas [facility]   8:55 PM            Sincerely,        Michelle Davison, NP

## 2022-11-08 NOTE — PROGRESS NOTES
[facility] GERIATRICS    Chief Complaint   Patient presents with     RECHECK     fever     HPI:  Shalini Mann is a 77 year old  (1945), who is being seen today for an episodic care visit at: No question data found..     Today's concern is:   Cough/Fever: RN reported patient has a fever of 100.6 this morning. Has cough, especially with deep breathing. Up resting on couch upon exam sleeping, staff reports this is uncharacteristic of her baseline. Denies feeling ill, weak or sick. Did state she was fatigued. Rapid swab negative for covid-19. Vital signs are otherwise within normal limits. More resistant to cares and anxious.     BP Readings from Last 3 Encounters:   11/08/22 125/78   07/11/22 124/70   04/27/22 (!) 126/96     Pulse Readings from Last 4 Encounters:   11/08/22 76   07/11/22 76   04/27/22 68   01/10/22 74     Wt Readings from Last 4 Encounters:   11/08/22 84.7 kg (186 lb 12.8 oz)   07/11/22 50.8 kg (112 lb)   04/27/22 72.6 kg (160 lb)   01/10/22 71.1 kg (156 lb 12.8 oz)           Allergies, and PMH/PSH reviewed in [facility] today.  REVIEW OF SYSTEMS:  4 point ROS including Respiratory, CV, GI and , other than that noted in the HPI,  is negative    Objective:   A & O x 3, NAD. Lungs CTA, non labored. RRR, S1/S2 w/o murmur,gallop or rub.  edema.  Abdomen soft, nontender, +BT'S. No focal neurological deficits.  alert and oriented with confusion. +cough.       Labs done in SNF are in [facility]. Please refer to them using [facility]/Care Everywhere. and Recent labs in [facility] reviewed by me today.     Assessment/Plan:  (E66.01) Morbid obesity (H)  (primary encounter diagnosis)  Comment:   Body mass index is 35.3 kg/m .  Plan:   -Encourage healthy meals and light exercise as tolerated.     (R50.9) Fever in adult  (R05.1) Acute cough  Comment: Acute, febrile with dry cough. Respiratory status stable. Rapid Covid-19 negative   Plan:   -Acetaminophen 1000 mg PO BID and Acetaminophen 1000 mg PO Daily  PRN  -Influenza swab   -UA/UC  -Rapid swab      MED REC REQUIREDs2  Post Medication Reconciliation Status: patient was not discharged from an inpatient facility or TCU      Orders:    Acetaminophen 1000 mg PO TID and acetaminophen 1000 mg PO daily PRN    Rapid covid-19 swab    UA/UC    Influenza PCR    Electronically signed by:   MEKA Rivas [facility]   8:55 PM

## 2022-11-09 ENCOUNTER — LAB REQUISITION (OUTPATIENT)
Dept: LAB | Facility: CLINIC | Age: 77
End: 2022-11-09
Payer: MEDICARE

## 2022-11-09 DIAGNOSIS — R41.0 DISORIENTATION, UNSPECIFIED: ICD-10-CM

## 2022-11-09 LAB
FLUAV AG SPEC QL IA: POSITIVE
FLUBV AG SPEC QL IA: NEGATIVE
SARS-COV-2 RNA RESP QL NAA+PROBE: NEGATIVE

## 2022-11-10 ENCOUNTER — LAB REQUISITION (OUTPATIENT)
Dept: LAB | Facility: CLINIC | Age: 77
End: 2022-11-10
Payer: MEDICARE

## 2022-11-10 DIAGNOSIS — R94.4 ABNORMAL RESULTS OF KIDNEY FUNCTION STUDIES: ICD-10-CM

## 2022-11-11 ENCOUNTER — LAB REQUISITION (OUTPATIENT)
Dept: LAB | Facility: CLINIC | Age: 77
End: 2022-11-11
Payer: MEDICARE

## 2022-11-11 DIAGNOSIS — R94.4 ABNORMAL RESULTS OF KIDNEY FUNCTION STUDIES: ICD-10-CM

## 2022-11-11 LAB
CREAT SERPL-MCNC: 0.55 MG/DL (ref 0.51–0.95)
GFR SERPL CREATININE-BSD FRML MDRD: >90 ML/MIN/1.73M2

## 2022-11-11 PROCEDURE — 36415 COLL VENOUS BLD VENIPUNCTURE: CPT | Mod: ORL | Performed by: NURSE PRACTITIONER

## 2022-11-11 PROCEDURE — P9603 ONE-WAY ALLOW PRORATED MILES: HCPCS | Mod: ORL | Performed by: NURSE PRACTITIONER

## 2022-11-11 PROCEDURE — 82565 ASSAY OF CREATININE: CPT | Mod: ORL | Performed by: NURSE PRACTITIONER

## 2022-11-16 ENCOUNTER — DOCUMENTATION ONLY (OUTPATIENT)
Dept: GERIATRICS | Facility: CLINIC | Age: 77
End: 2022-11-16

## 2022-11-29 ENCOUNTER — ASSISTED LIVING VISIT (OUTPATIENT)
Dept: GERIATRICS | Facility: CLINIC | Age: 77
End: 2022-11-29
Payer: MEDICARE

## 2022-11-29 VITALS
DIASTOLIC BLOOD PRESSURE: 78 MMHG | RESPIRATION RATE: 14 BRPM | HEIGHT: 61 IN | TEMPERATURE: 98 F | WEIGHT: 186.8 LBS | SYSTOLIC BLOOD PRESSURE: 125 MMHG | HEART RATE: 76 BPM | OXYGEN SATURATION: 96 % | BODY MASS INDEX: 35.27 KG/M2

## 2022-11-29 DIAGNOSIS — H04.123 DRY EYES: ICD-10-CM

## 2022-11-29 DIAGNOSIS — F03.90 SENILE DEMENTIA, UNCOMPLICATED (H): Primary | ICD-10-CM

## 2022-11-29 DIAGNOSIS — E03.9 HYPOTHYROIDISM, UNSPECIFIED TYPE: ICD-10-CM

## 2022-11-29 DIAGNOSIS — F69 ADULT BEHAVIOR PROBLEM: ICD-10-CM

## 2022-11-29 NOTE — LETTER
"    11/29/2022        RE: Shalini Mann  [address] [place] [address]        [facility] GERIATRICS    Chief Complaint   Patient presents with     RECHECK     HPI:  Shalini Mann is a 77 year old  (1945), who is being seen today for an episodic care visit at: [facility] ([place]) [[id number]]. Today's concern is: 77-year-old female with history of dementia, GERD, hypothyroidism, Graves' disease, and morbid obesity.  Has been reporting she  has increased anxiety and monitoring secondary to cognitive impairment.  Currently taking Seroquel 25 mg p.o. 3 times daily.  Pleasant with writer today, alert to self only.  Patient was wandering the halls at time of visit.  Some resistance with exam.  No further complaints per staff or patient    BP Readings from Last 3 Encounters:   11/29/22 125/78   11/08/22 125/78   07/11/22 124/70     Pulse Readings from Last 4 Encounters:   11/29/22 76   11/08/22 76   07/11/22 76   04/27/22 68     Wt Readings from Last 4 Encounters:   11/29/22 84.7 kg (186 lb 12.8 oz)   11/08/22 84.7 kg (186 lb 12.8 oz)   07/11/22 50.8 kg (112 lb)   04/27/22 72.6 kg (160 lb)     Allergies, and PMH/PSH reviewed in [facility] today.  REVIEW OF SYSTEMS:  Unobtainable secondary to cognitive impairment.     Objective:   /78   Pulse 76   Temp 98  F (36.7  C)   Resp 14   Ht 1.549 m (5' 1\")   Wt 84.7 kg (186 lb 12.8 oz)   LMP  (LMP Unknown)   SpO2 96%   BMI 35.30 kg/m    GENERAL APPEARANCE:  Alert, in no distress  ENT:  Mouth and posterior oropharynx normal, moist mucous membranes, [place]  RESP:  respiratory effort and palpation of chest normal, lungs clear to auscultation   CV:  Palpation and auscultation of heart done , regular rate and rhythm, no murmur, rub, or gallop  ABDOMEN:  normal bowel sounds, soft, nontender, no hepatosplenomegaly or other masses  M/S:   Gait and station normal  Digits and nails normal  SKIN:  Inspection of skin and subcutaneous tissue baseline, " Palpation of skin and subcutaneous tissue baseline  NEURO:   Cranial nerves 2-12 are normal tested and grossly at patient's baseline  PSYCH:  memory impaired , affect and mood normal    Labs done in SNF are in [facility]. Please refer to them using EPIC/Care Everywhere. and Recent labs in EPIC reviewed by me today.     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  Now with increased wandering and behaviors.  Without recent falls or weight loss.  Plan:   Increase Seroquel to 37.5 mg p.o. twice daily and keep Seroquel 25 mg p.o. daily.  -Monitor closely update NP with changes  -Expect further decline with progression of disease,   Including increased weight loss.  -Continue memory care support with medication administration, meals, safety.    (E03.9) Hypothyroidism, unspecified type  Comment:   TSH   Date Value Ref Range Status   08/15/2022 0.95 0.30 - 4.20 uIU/mL Final   06/14/2022 0.06 (L) 0.30 - 5.00 uIU/mL Final   Plan:   -Low TSH, currently taking levothyroxine 100 MCG daily  -Reduce levothyroxine to 88 MCG daily.  TSH in 6 weeks.     (H04.123) Dry eyes  Comment: Acute/chronic dry eye.  Plan:   -Refresh eyedrops 1 drop bilateral eye twice daily.  Monitor.        MED REC REQUIRED  Post Medication Reconciliation Status: patient was not discharged from an inpatient facility or TCU      Orders:  1.  Increase Seroquel to 37.5 mg p.o. twice daily.  Continue daily dose of 25 mg with no change.  2.  Levothyroxine to 88 MCG's a day.  TSH 6 weeks.  3. Refresh eyedrops 1 drop bilateral eyes Twice daily.    Electronically signed by:   Michelle Davison NP            Sincerely,        Michelle Davison NP

## 2022-11-29 NOTE — PROGRESS NOTES
"[facility] GERIATRICS    Chief Complaint   Patient presents with     RECHECK     HPI:  Shalini Mann is a 77 year old  (1945), who is being seen today for an episodic care visit [facility]: [facility] ([place]) [[id number]]. Today's concern is: 77-year-old female with history of dementia, GERD, hypothyroidism, Graves' disease, and morbid obesity.  Has been reporting she  has increased anxiety and monitoring secondary to cognitive impairment.  Currently taking Seroquel 25 mg p.o. 3 times daily.  Pleasant with writer today, alert to self only.  Patient was wandering the halls at time of visit.  Some resistance with exam.  No further complaints per staff or patient    BP Readings from Last 3 Encounters:   11/29/22 125/78   11/08/22 125/78   07/11/22 124/70     Pulse Readings from Last 4 Encounters:   11/29/22 76   11/08/22 76   07/11/22 76   04/27/22 68     Wt Readings from Last 4 Encounters:   11/29/22 84.7 kg (186 lb 12.8 oz)   11/08/22 84.7 kg (186 lb 12.8 oz)   07/11/22 50.8 kg (112 lb)   04/27/22 72.6 kg (160 lb)     Allergies, and PMH/PSH reviewed in [facility] today.  REVIEW OF SYSTEMS:  Unobtainable secondary to cognitive impairment.     Objective:   /78   Pulse 76   Temp 98  F (36.7  C)   Resp 14   Ht 1.549 m (5' 1\")   Wt 84.7 kg (186 lb 12.8 oz)   LMP  (LMP Unknown)   SpO2 96%   BMI 35.30 kg/m    GENERAL APPEARANCE:  Alert, in no distress  ENT:  Mouth and posterior oropharynx normal, moist mucous membranes, [place]  RESP:  respiratory effort and palpation of chest normal, lungs clear to auscultation   CV:  Palpation and auscultation of heart done , regular rate and rhythm, no murmur, rub, or gallop  ABDOMEN:  normal bowel sounds, soft, nontender, no hepatosplenomegaly or other masses  M/S:   Gait and station normal  Digits and nails normal  SKIN:  Inspection of skin and subcutaneous tissue baseline, Palpation of skin and subcutaneous tissue baseline  NEURO:   Cranial nerves 2-12 are " normal tested and grossly at patient's baseline  PSYCH:  memory impaired , affect and mood normal    Labs done in SNF are in [facility]. Please refer to them using EPIC/Care Everywhere. and Recent labs in EPIC reviewed by me today.     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  Now with increased wandering and behaviors.  Without recent falls or weight loss.  Plan:   Increase Seroquel to 37.5 mg p.o. twice daily and keep Seroquel 25 mg p.o. daily.  -Monitor closely update NP with changes  -Expect further decline with progression of disease,   Including increased weight loss.  -Continue memory care support with medication administration, meals, safety.    (E03.9) Hypothyroidism, unspecified type  Comment:   TSH   Date Value Ref Range Status   08/15/2022 0.95 0.30 - 4.20 uIU/mL Final   06/14/2022 0.06 (L) 0.30 - 5.00 uIU/mL Final   Plan:   -Low TSH, currently taking levothyroxine 100 MCG daily  -Reduce levothyroxine to 88 MCG daily.  TSH in 6 weeks.     (H04.123) Dry eyes  Comment: Acute/chronic dry eye.  Plan:   -Refresh eyedrops 1 drop bilateral eye twice daily.  Monitor.        MED REC REQUIRED  Post Medication Reconciliation Status: patient was not discharged from an inpatient facility or TCU      Orders:  1.  Increase Seroquel to 37.5 mg p.o. twice daily.  Continue daily dose of 25 mg with no change.  2.  Levothyroxine to 88 MCG's a day.  TSH 6 weeks.  3. Refresh eyedrops 1 drop bilateral eyes Twice daily.    Electronically signed by:   Michelle Davison NP

## 2023-01-01 ENCOUNTER — NURSING HOME VISIT (OUTPATIENT)
Dept: GERIATRICS | Facility: CLINIC | Age: 78
End: 2023-01-01
Payer: MEDICARE

## 2023-01-01 ENCOUNTER — OFFICE VISIT (OUTPATIENT)
Dept: FAMILY MEDICINE | Facility: CLINIC | Age: 78
End: 2023-01-01
Payer: MEDICARE

## 2023-01-01 ENCOUNTER — TELEPHONE (OUTPATIENT)
Dept: GERIATRICS | Facility: CLINIC | Age: 78
End: 2023-01-01

## 2023-01-01 ENCOUNTER — HEALTH MAINTENANCE LETTER (OUTPATIENT)
Age: 78
End: 2023-01-01

## 2023-01-01 ENCOUNTER — TELEPHONE (OUTPATIENT)
Dept: GERIATRICS | Facility: CLINIC | Age: 78
End: 2023-01-01
Payer: MEDICARE

## 2023-01-01 ENCOUNTER — LAB REQUISITION (OUTPATIENT)
Dept: LAB | Facility: CLINIC | Age: 78
End: 2023-01-01
Payer: MEDICARE

## 2023-01-01 ENCOUNTER — ASSISTED LIVING VISIT (OUTPATIENT)
Dept: GERIATRICS | Facility: CLINIC | Age: 78
End: 2023-01-01
Payer: MEDICARE

## 2023-01-01 ENCOUNTER — DOCUMENTATION ONLY (OUTPATIENT)
Dept: GERIATRICS | Facility: CLINIC | Age: 78
End: 2023-01-01
Payer: MEDICARE

## 2023-01-01 VITALS
BODY MASS INDEX: 33.53 KG/M2 | RESPIRATION RATE: 23 BRPM | OXYGEN SATURATION: 99 % | HEART RATE: 83 BPM | HEIGHT: 61 IN | TEMPERATURE: 98.1 F | DIASTOLIC BLOOD PRESSURE: 74 MMHG | WEIGHT: 177.6 LBS | SYSTOLIC BLOOD PRESSURE: 122 MMHG

## 2023-01-01 VITALS
SYSTOLIC BLOOD PRESSURE: 144 MMHG | RESPIRATION RATE: 17 BRPM | TEMPERATURE: 98.7 F | HEART RATE: 76 BPM | DIASTOLIC BLOOD PRESSURE: 68 MMHG | WEIGHT: 181 LBS | HEIGHT: 61 IN | BODY MASS INDEX: 34.17 KG/M2 | OXYGEN SATURATION: 97 %

## 2023-01-01 VITALS
BODY MASS INDEX: 30.48 KG/M2 | SYSTOLIC BLOOD PRESSURE: 113 MMHG | DIASTOLIC BLOOD PRESSURE: 74 MMHG | WEIGHT: 172 LBS | OXYGEN SATURATION: 97 % | HEIGHT: 63 IN | TEMPERATURE: 97.8 F | HEART RATE: 98 BPM | RESPIRATION RATE: 18 BRPM

## 2023-01-01 VITALS
SYSTOLIC BLOOD PRESSURE: 144 MMHG | HEIGHT: 61 IN | OXYGEN SATURATION: 98 % | TEMPERATURE: 97.9 F | DIASTOLIC BLOOD PRESSURE: 68 MMHG | BODY MASS INDEX: 34.32 KG/M2 | RESPIRATION RATE: 17 BRPM | HEART RATE: 76 BPM | WEIGHT: 181.8 LBS

## 2023-01-01 VITALS
RESPIRATION RATE: 20 BRPM | OXYGEN SATURATION: 98 % | DIASTOLIC BLOOD PRESSURE: 70 MMHG | HEART RATE: 83 BPM | SYSTOLIC BLOOD PRESSURE: 128 MMHG | WEIGHT: 176.3 LBS | TEMPERATURE: 97.5 F | BODY MASS INDEX: 33.31 KG/M2

## 2023-01-01 VITALS
DIASTOLIC BLOOD PRESSURE: 74 MMHG | SYSTOLIC BLOOD PRESSURE: 122 MMHG | WEIGHT: 177 LBS | OXYGEN SATURATION: 98 % | BODY MASS INDEX: 33.42 KG/M2 | TEMPERATURE: 97.5 F | RESPIRATION RATE: 23 BRPM | HEART RATE: 83 BPM | HEIGHT: 61 IN

## 2023-01-01 DIAGNOSIS — E03.9 HYPOTHYROIDISM, UNSPECIFIED TYPE: ICD-10-CM

## 2023-01-01 DIAGNOSIS — K30 STOMACH UPSET: Primary | ICD-10-CM

## 2023-01-01 DIAGNOSIS — E03.9 HYPOTHYROIDISM, UNSPECIFIED TYPE: Primary | ICD-10-CM

## 2023-01-01 DIAGNOSIS — F03.918 SENILE DEMENTIA, WITH BEHAVIORAL DISTURBANCE (H): Primary | ICD-10-CM

## 2023-01-01 DIAGNOSIS — E74.819 DISORDERS OF GLUCOSE TRANSPORT, UNSPECIFIED (H): ICD-10-CM

## 2023-01-01 DIAGNOSIS — E78.5 HYPERLIPIDEMIA, UNSPECIFIED HYPERLIPIDEMIA TYPE: ICD-10-CM

## 2023-01-01 DIAGNOSIS — F03.90 SENILE DEMENTIA, UNCOMPLICATED (H): ICD-10-CM

## 2023-01-01 DIAGNOSIS — Z13.9 SCREENING FOR CONDITION: ICD-10-CM

## 2023-01-01 DIAGNOSIS — F03.90 SENILE DEMENTIA, UNCOMPLICATED (H): Primary | ICD-10-CM

## 2023-01-01 DIAGNOSIS — E03.8 OTHER SPECIFIED HYPOTHYROIDISM: ICD-10-CM

## 2023-01-01 DIAGNOSIS — D64.9 NORMOCYTIC ANEMIA: ICD-10-CM

## 2023-01-01 DIAGNOSIS — F69 ADULT BEHAVIOR PROBLEM: ICD-10-CM

## 2023-01-01 DIAGNOSIS — F03.94 DEMENTIA WITH ANXIETY, UNSPECIFIED DEMENTIA SEVERITY, UNSPECIFIED DEMENTIA TYPE (H): Primary | ICD-10-CM

## 2023-01-01 DIAGNOSIS — K21.00 GASTROESOPHAGEAL REFLUX DISEASE WITH ESOPHAGITIS WITHOUT HEMORRHAGE: ICD-10-CM

## 2023-01-01 DIAGNOSIS — F41.9 ANXIETY: ICD-10-CM

## 2023-01-01 DIAGNOSIS — E78.5 HYPERLIPIDEMIA, UNSPECIFIED: ICD-10-CM

## 2023-01-01 DIAGNOSIS — E66.01 MORBID OBESITY (H): ICD-10-CM

## 2023-01-01 DIAGNOSIS — L30.4 INTERTRIGO: Primary | ICD-10-CM

## 2023-01-01 DIAGNOSIS — E78.5 DYSLIPIDEMIA: Primary | ICD-10-CM

## 2023-01-01 DIAGNOSIS — E89.0 HYPOTHYROIDISM FOLLOWING RADIOIODINE THERAPY: ICD-10-CM

## 2023-01-01 DIAGNOSIS — H26.8 PSEUDOEXFOLIATION OF LENS CAPSULE: ICD-10-CM

## 2023-01-01 DIAGNOSIS — H40.9 GLAUCOMA, UNSPECIFIED GLAUCOMA TYPE, UNSPECIFIED LATERALITY: ICD-10-CM

## 2023-01-01 LAB
ANION GAP SERPL CALCULATED.3IONS-SCNC: 13 MMOL/L (ref 7–15)
BASOPHILS # BLD AUTO: 0.1 10E3/UL (ref 0–0.2)
BASOPHILS NFR BLD AUTO: 1 %
BUN SERPL-MCNC: 6.5 MG/DL (ref 8–23)
CALCIUM SERPL-MCNC: 9.1 MG/DL (ref 8.8–10.2)
CHLORIDE SERPL-SCNC: 105 MMOL/L (ref 98–107)
CREAT SERPL-MCNC: 0.69 MG/DL (ref 0.51–0.95)
DEPRECATED HCO3 PLAS-SCNC: 22 MMOL/L (ref 22–29)
EOSINOPHIL # BLD AUTO: 0.1 10E3/UL (ref 0–0.7)
EOSINOPHIL NFR BLD AUTO: 1 %
ERYTHROCYTE [DISTWIDTH] IN BLOOD BY AUTOMATED COUNT: 13.5 % (ref 10–15)
GFR SERPL CREATININE-BSD FRML MDRD: 88 ML/MIN/1.73M2
GLUCOSE SERPL-MCNC: 130 MG/DL (ref 70–99)
HBA1C MFR BLD: 5.2 % (ref 0–5.6)
HCT VFR BLD AUTO: 38.5 % (ref 35–47)
HGB BLD-MCNC: 12.2 G/DL (ref 11.7–15.7)
IMM GRANULOCYTES # BLD: 0 10E3/UL
IMM GRANULOCYTES NFR BLD: 0 %
LYMPHOCYTES # BLD AUTO: 1.7 10E3/UL (ref 0.8–5.3)
LYMPHOCYTES NFR BLD AUTO: 21 %
MCH RBC QN AUTO: 29.9 PG (ref 26.5–33)
MCHC RBC AUTO-ENTMCNC: 31.7 G/DL (ref 31.5–36.5)
MCV RBC AUTO: 94 FL (ref 78–100)
MONOCYTES # BLD AUTO: 0.6 10E3/UL (ref 0–1.3)
MONOCYTES NFR BLD AUTO: 7 %
NEUTROPHILS # BLD AUTO: 5.9 10E3/UL (ref 1.6–8.3)
NEUTROPHILS NFR BLD AUTO: 70 %
NRBC # BLD AUTO: 0 10E3/UL
NRBC BLD AUTO-RTO: 0 /100
PLATELET # BLD AUTO: 268 10E3/UL (ref 150–450)
POTASSIUM SERPL-SCNC: 3.5 MMOL/L (ref 3.4–5.3)
RBC # BLD AUTO: 4.08 10E6/UL (ref 3.8–5.2)
SODIUM SERPL-SCNC: 140 MMOL/L (ref 136–145)
T4 FREE SERPL-MCNC: 1.46 NG/DL (ref 0.9–1.7)
TSH SERPL DL<=0.005 MIU/L-ACNC: 0.05 UIU/ML (ref 0.3–4.2)
TSH SERPL DL<=0.005 MIU/L-ACNC: 4.82 UIU/ML (ref 0.3–4.2)
TSH SERPL DL<=0.005 MIU/L-ACNC: 5.51 UIU/ML (ref 0.3–4.2)
WBC # BLD AUTO: 8.3 10E3/UL (ref 4–11)

## 2023-01-01 PROCEDURE — 99309 SBSQ NF CARE MODERATE MDM 30: CPT | Performed by: NURSE PRACTITIONER

## 2023-01-01 PROCEDURE — 99349 HOME/RES VST EST MOD MDM 40: CPT | Performed by: NURSE PRACTITIONER

## 2023-01-01 PROCEDURE — 84439 ASSAY OF FREE THYROXINE: CPT | Mod: ORL

## 2023-01-01 PROCEDURE — P9604 ONE-WAY ALLOW PRORATED TRIP: HCPCS | Mod: ORL | Performed by: FAMILY MEDICINE

## 2023-01-01 PROCEDURE — 36415 COLL VENOUS BLD VENIPUNCTURE: CPT | Performed by: PHYSICIAN ASSISTANT

## 2023-01-01 PROCEDURE — 80048 BASIC METABOLIC PNL TOTAL CA: CPT | Mod: ORL | Performed by: FAMILY MEDICINE

## 2023-01-01 PROCEDURE — 99348 HOME/RES VST EST LOW MDM 30: CPT | Performed by: NURSE PRACTITIONER

## 2023-01-01 PROCEDURE — 84443 ASSAY THYROID STIM HORMONE: CPT | Mod: ORL

## 2023-01-01 PROCEDURE — 85025 COMPLETE CBC W/AUTO DIFF WBC: CPT | Mod: ORL | Performed by: FAMILY MEDICINE

## 2023-01-01 PROCEDURE — 99213 OFFICE O/P EST LOW 20 MIN: CPT | Performed by: PHYSICIAN ASSISTANT

## 2023-01-01 PROCEDURE — 84443 ASSAY THYROID STIM HORMONE: CPT | Mod: ORL | Performed by: FAMILY MEDICINE

## 2023-01-01 PROCEDURE — 36415 COLL VENOUS BLD VENIPUNCTURE: CPT | Mod: ORL

## 2023-01-01 PROCEDURE — 83036 HEMOGLOBIN GLYCOSYLATED A1C: CPT | Performed by: PHYSICIAN ASSISTANT

## 2023-01-01 PROCEDURE — 99305 1ST NF CARE MODERATE MDM 35: CPT | Performed by: FAMILY MEDICINE

## 2023-01-01 PROCEDURE — P9604 ONE-WAY ALLOW PRORATED TRIP: HCPCS | Mod: ORL

## 2023-01-01 PROCEDURE — 36415 COLL VENOUS BLD VENIPUNCTURE: CPT | Mod: ORL | Performed by: FAMILY MEDICINE

## 2023-01-01 RX ORDER — LIDOCAINE 4 G/G
1 PATCH TOPICAL 2 TIMES DAILY
COMMUNITY

## 2023-01-01 RX ORDER — KETOCONAZOLE 20 MG/G
CREAM TOPICAL DAILY
Qty: 60 G | Refills: 0 | Status: SHIPPED | OUTPATIENT
Start: 2023-01-01 | End: 2023-01-01

## 2023-01-01 RX ORDER — OLANZAPINE 5 MG/1
5 TABLET ORAL 2 TIMES DAILY
COMMUNITY
End: 2023-01-01

## 2023-01-01 RX ORDER — KETOCONAZOLE 20 MG/G
CREAM TOPICAL DAILY
COMMUNITY

## 2023-01-01 RX ORDER — QUETIAPINE FUMARATE 25 MG/1
25 TABLET, FILM COATED ORAL DAILY
COMMUNITY
End: 2023-01-01

## 2023-01-01 RX ORDER — LEVOTHYROXINE SODIUM 88 UG/1
88 TABLET ORAL DAILY
Qty: 90 TABLET | Refills: 3 | Status: SHIPPED | OUTPATIENT
Start: 2023-01-01 | End: 2023-01-01

## 2023-01-01 RX ORDER — LEVOTHYROXINE SODIUM 75 UG/1
TABLET ORAL
Qty: 31 TABLET | Refills: 11 | Status: SHIPPED | OUTPATIENT
Start: 2023-01-01 | End: 2023-01-01 | Stop reason: DRUGHIGH

## 2023-01-01 RX ORDER — LEVOTHYROXINE SODIUM 100 UG/1
100 TABLET ORAL DAILY
COMMUNITY
Start: 2023-01-01

## 2023-01-01 RX ORDER — OLANZAPINE 5 MG/1
5 TABLET ORAL 2 TIMES DAILY
Qty: 62 TABLET | Refills: 11 | Status: SHIPPED | OUTPATIENT
Start: 2023-01-01

## 2023-01-01 RX ORDER — QUETIAPINE FUMARATE 25 MG/1
TABLET, FILM COATED ORAL
Qty: 300 TABLET | Refills: 3 | Status: SHIPPED | OUTPATIENT
Start: 2023-01-01 | End: 2023-01-01

## 2023-01-01 RX ORDER — QUETIAPINE FUMARATE 25 MG/1
TABLET, FILM COATED ORAL
Qty: 90 TABLET | Refills: 11 | Status: SHIPPED | OUTPATIENT
Start: 2023-01-01 | End: 2023-01-01

## 2023-01-01 RX ORDER — LEVOTHYROXINE SODIUM 75 UG/1
75 TABLET ORAL DAILY
COMMUNITY
End: 2023-01-01

## 2023-01-01 RX ORDER — BIMATOPROST 0.1 MG/ML
SOLUTION/ DROPS OPHTHALMIC
Qty: 2.5 ML | Refills: 11 | Status: SHIPPED | OUTPATIENT
Start: 2023-01-01

## 2023-01-01 RX ORDER — OMEPRAZOLE 20 MG/1
20 TABLET, DELAYED RELEASE ORAL DAILY
COMMUNITY

## 2023-01-01 RX ORDER — QUETIAPINE FUMARATE 25 MG/1
25 TABLET, FILM COATED ORAL 4 TIMES DAILY
COMMUNITY

## 2023-01-17 NOTE — PROGRESS NOTES
"[facility] GERIATRICS    Chief Complaint   Patient presents with     RECHECK     HPI:  Shalini Mann is a 77 year old  (1945), who is being seen today for an episodic care visit at: [facility] ([place]) [[id number]]. Today's concern is: Patient is a 77-year-old female with past medical history significant for dementia with behaviors.  Patient was evaluated today secondary to nursing concerns regarding increased anxiety and wandering.  Patient was resting today but declined evaluation.  Very resistant to cares.  Currently taking Seroquel 37.5 mg p.o. twice daily and 25 mg p.o. daily.  No further concerns per staff.    Allergies, and PMH/PSH reviewed in [facility] today.  REVIEW OF SYSTEMS:  Unobtainable secondary to cognitive impairment.     Objective:   BP (!) 144/68   Pulse 76   Temp 97.9  F (36.6  C)   Resp 17   Ht 1.549 m (5' 1\")   Wt 82.5 kg (181 lb 12.8 oz)   LMP  (LMP Unknown)   SpO2 98%   BMI 34.35 kg/m    A & O x 1, NAD. Lungs CTA, non labored. RRR, S1/S2 w/o murmur,gallop or rub.  edema.  Abdomen soft, nontender, +BT'S. No focal neurological deficits.        Recent labs in [facility] reviewed by me today.     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  Now with increased behaviors and resistance to care.  Plan:   Seroquel at current dose.  -Add olanzapine 2.5 mg p.o. daily  Expect further decline with progression of disease.  Continue memory care support medication administration, meals, safety.    (E66.01) Morbid obesity (H)  Comment: Body mass index is 34.35 kg/m .  Plan:   -Encourage healthy meals and snacks.  Follow weight.        MED REC REQUIRED  Post Medication Reconciliation Status: patient was not discharged from an inpatient facility or TCU      Orders:  Olanzapine 2.5 mg p.o. daily    Electronically signed by:   Michelle Davison NP      "

## 2023-01-17 NOTE — LETTER
"    1/17/2023        RE: Shalini Mann  [address] [place] [address]        [facility] GERIATRICS    Chief Complaint   Patient presents with     RECHECK     HPI:  Shalini Mann is a 77 year old  (1945), who is being seen today for an episodic care visit at: [facility] ([place]) [[id number]]. Today's concern is: Patient is a 77-year-old female with past medical history significant for dementia with behaviors.  Patient was evaluated today secondary to nursing concerns regarding increased anxiety and wandering.  Patient was resting today but declined evaluation.  Very resistant to cares.  Currently taking Seroquel 37.5 mg p.o. twice daily and 25 mg p.o. daily.  No further concerns per staff.    Allergies, and PMH/PSH reviewed in [facility] today.  REVIEW OF SYSTEMS:  Unobtainable secondary to cognitive impairment.     Objective:   BP (!) 144/68   Pulse 76   Temp 97.9  F (36.6  C)   Resp 17   Ht 1.549 m (5' 1\")   Wt 82.5 kg (181 lb 12.8 oz)   LMP  (LMP Unknown)   SpO2 98%   BMI 34.35 kg/m    A & O x 1, NAD. Lungs CTA, non labored. RRR, S1/S2 w/o murmur,gallop or rub.  edema.  Abdomen soft, nontender, +BT'S. No focal neurological deficits.        Recent labs in [facility] reviewed by me today.     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  Now with increased behaviors and resistance to care.  Plan:   Seroquel at current dose.  -Add olanzapine 2.5 mg p.o. daily  Expect further decline with progression of disease.  Continue memory care support medication administration, meals, safety.    (E66.01) Morbid obesity (H)  Comment: Body mass index is 34.35 kg/m .  Plan:   -Encourage healthy meals and snacks.  Follow weight.        MED REC REQUIRED  Post Medication Reconciliation Status: patient was not discharged from an inpatient facility or TCU      Orders:  Olanzapine 2.5 mg p.o. daily    Electronically signed by:   Michelle Davison, " NP            Sincerely,        Michelle Davison, NP

## 2023-02-14 NOTE — LETTER
"    2/14/2023        RE: Shalini Mann  [address] [place] [address]        [facility] GERIATRICS    Chief Complaint   Patient presents with     RECHECK     HPI:  Shalini Mann is a 77 year old  (1945), who is being seen today for an episodic care visit at: [facility] ([place]) [[id number]]. Today's concern is:   Patient with increased confusion and behaviors noted by staff. Taking seroquel and zyprexa with little improvement.     BP Readings from Last 3 Encounters:   02/14/23 (!) 144/68   01/17/23 (!) 144/68   11/29/22 125/78     Pulse Readings from Last 4 Encounters:   02/14/23 76   01/17/23 76   11/29/22 76   11/08/22 76         Allergies, and PMH/PSH reviewed in [facility] today.  REVIEW OF SYSTEMS:  Unobtainable secondary to cognitive impairment.     Objective:   BP (!) 144/68   Pulse 76   Temp 98.7  F (37.1  C)   Resp 17   Ht 1.549 m (5' 1\")   Wt 82.1 kg (181 lb)   LMP  (LMP Unknown)   SpO2 97%   BMI 34.20 kg/m    A & O x 1, NAD. Lungs CTA, non labored. RRR, S1/S2 w/o murmur,gallop or rub.  edema.  Abdomen soft, nontender, +BT'S. No focal neurological deficits.        Recent labs in [facility] reviewed by me today.     Assessment/Plan:  Senile dementia, with behavioral disturbance (H)  Chronic dementia with progressing behaviors.   Increase seroquel 37.5 mg PO TID.  -Monitor and update NP with changes.     MED REC REQUIRED  Post Medication Reconciliation Status: patient was not discharged from an inpatient facility or TCU      Orders:  1. Increase seroquel 37.5 mg PO TID    Electronically signed by:   Michelle Davison NP          Sincerely,        Michelle Davison NP    "

## 2023-02-14 NOTE — PROGRESS NOTES
"[facility] GERIATRICS    Chief Complaint   Patient presents with     RECHECK     HPI:  Shalini Mann is a 77 year old  (1945), who is being seen today for an episodic care visit at: [facility] ([place]) [[id number]]. Today's concern is:   Patient with increased confusion and behaviors noted by staff. Taking seroquel and zyprexa with little improvement.     BP Readings from Last 3 Encounters:   02/14/23 (!) 144/68   01/17/23 (!) 144/68   11/29/22 125/78     Pulse Readings from Last 4 Encounters:   02/14/23 76   01/17/23 76   11/29/22 76   11/08/22 76         Allergies, and PMH/PSH reviewed in [facility] today.  REVIEW OF SYSTEMS:  Unobtainable secondary to cognitive impairment.     Objective:   BP (!) 144/68   Pulse 76   Temp 98.7  F (37.1  C)   Resp 17   Ht 1.549 m (5' 1\")   Wt 82.1 kg (181 lb)   LMP  (LMP Unknown)   SpO2 97%   BMI 34.20 kg/m    A & O x 1, NAD. Lungs CTA, non labored. RRR, S1/S2 w/o murmur,gallop or rub.  edema.  Abdomen soft, nontender, +BT'S. No focal neurological deficits.        Recent labs in [facility] reviewed by me today.     Assessment/Plan:  Senile dementia, with behavioral disturbance (H)  Chronic dementia with progressing behaviors.   Increase seroquel 37.5 mg PO TID.  -Monitor and update NP with changes.     MED REC REQUIRED  Post Medication Reconciliation Status: patient was not discharged from an inpatient facility or TCU      Orders:  1. Increase seroquel 37.5 mg PO TID    Electronically signed by:   Michelle Davison NP      "

## 2023-05-23 NOTE — LETTER
"    5/23/2023        RE: Shalini Mann  [address] [place] [address]        [facility] GERIATRICS    Chief Complaint   Patient presents with     Nursing Home Acute     HPI:  Shalini Mann is a 77 year old  (1945), who is being seen today for an episodic care visit at: [facility] ([place]) [[id number]]. Today's concern is: patient complaining of upset stomach for several days, possibly related to recent change in zyprexa. No indications of illness, denies nausea/vomiting/diarrhea.     BP Readings from Last 3 Encounters:   05/23/23 122/74   02/14/23 (!) 144/68   01/17/23 (!) 144/68     Pulse Readings from Last 4 Encounters:   05/23/23 83   02/14/23 76   01/17/23 76   11/29/22 76     Wt Readings from Last 4 Encounters:   05/23/23 80.6 kg (177 lb 9.6 oz)   02/14/23 82.1 kg (181 lb)   01/17/23 82.5 kg (181 lb 12.8 oz)   11/29/22 84.7 kg (186 lb 12.8 oz)         Allergies, and PMH/PSH reviewed in [facility] today.  REVIEW OF SYSTEMS:  Unobtainable secondary to cognitive impairment.     Objective:   /74   Pulse 83   Temp 98.1  F (36.7  C)   Resp 23   Ht 1.549 m (5' 1\")   Wt 80.6 kg (177 lb 9.6 oz)   LMP  (LMP Unknown)   SpO2 99%   BMI 33.56 kg/m    A & O x 1, NAD. Lungs CTA, non labored. RRR, S1/S2 w/o murmur,gallop or rub.  edema.  Abdomen soft, nontender, +BT'S. No focal neurological deficits.  ,      Recent labs in [facility] reviewed by me today.     Assessment/Plan:  (K30) Stomach upset  (primary encounter diagnosis)  New acute nausea, possibly 2/2 changes in zyprexa.  -TSH, CBC, BMP  -Monitor and update NP with changes, could be viral given it has only been a few days.     (E66.01) Morbid obesity (H)  Body mass index is 33.56 kg/m .  Encourage healthy snacks and meals  -Appreciate dietary following and making recommendations.     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  Improvement in behaviors  -Continue LTC support with medication administration, " meals and safety.           MED REC REQUIRED  Post Medication Reconciliation Status: patient was not discharged from an inpatient facility or TCU      Orders:  TSH, CBC, BMP    Electronically signed by:   Michelle Davison NP          Sincerely,        Michelle Davison, NP

## 2023-05-23 NOTE — PROGRESS NOTES
"[facility] GERIATRICS    Chief Complaint   Patient presents with     Nursing Home Acute     HPI:  Shalini Mann is a 77 year old  (1945), who is being seen today for an episodic care visit at: [facility] ([place]) [[id number]]. Today's concern is: patient complaining of upset stomach for several days, possibly related to recent change in zyprexa. No indications of illness, denies nausea/vomiting/diarrhea.     BP Readings from Last 3 Encounters:   05/23/23 122/74   02/14/23 (!) 144/68   01/17/23 (!) 144/68     Pulse Readings from Last 4 Encounters:   05/23/23 83   02/14/23 76   01/17/23 76   11/29/22 76     Wt Readings from Last 4 Encounters:   05/23/23 80.6 kg (177 lb 9.6 oz)   02/14/23 82.1 kg (181 lb)   01/17/23 82.5 kg (181 lb 12.8 oz)   11/29/22 84.7 kg (186 lb 12.8 oz)         Allergies, and PMH/PSH reviewed in [facility] today.  REVIEW OF SYSTEMS:  Unobtainable secondary to cognitive impairment.     Objective:   /74   Pulse 83   Temp 98.1  F (36.7  C)   Resp 23   Ht 1.549 m (5' 1\")   Wt 80.6 kg (177 lb 9.6 oz)   LMP  (LMP Unknown)   SpO2 99%   BMI 33.56 kg/m    A & O x 1, NAD. Lungs CTA, non labored. RRR, S1/S2 w/o murmur,gallop or rub.  edema.  Abdomen soft, nontender, +BT'S. No focal neurological deficits.  ,      Recent labs in [facility] reviewed by me today.     Assessment/Plan:  (K30) Stomach upset  (primary encounter diagnosis)  New acute nausea, possibly 2/2 changes in zyprexa.  -TSH, CBC, BMP  -Monitor and update NP with changes, could be viral given it has only been a few days.     (E66.01) Morbid obesity (H)  Body mass index is 33.56 kg/m .  Encourage healthy snacks and meals  -Appreciate dietary following and making recommendations.     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  Improvement in behaviors  -Continue LTC support with medication administration, meals and safety.           MED REC REQUIRED  Post Medication Reconciliation Status: " patient was not discharged from an inpatient facility or TCU      Orders:  TSH, CBC, BMP    Electronically signed by:   Michelle Davison, NP

## 2023-05-30 NOTE — TELEPHONE ENCOUNTER
[facility]iatric[facility] Lab Note     Provider: MEKA Goldsmith CNP  Facility: [facility]  Facility Type:  LT    Allergies   Allergen Reactions     Other Environmental Allergy Rash       Labs Reviewed by provider:      On levothyroxine 88mcg every day     Verbal Order/Direction given by Provider: decrease levothyroxine to 75mcg every day and recheck TSH in 8 weeks    Provider giving Order:  MEKA Goldsmith CNP    Verbal Order given to: Natasha Simon RN

## 2023-06-08 NOTE — TELEPHONE ENCOUNTER
[facility] Triage Nurse Telephone Encounter    Provider: MEKA Goldsmith CNP  Facility: [facility]  Facility Type:  [facility]    Caller: Nunu  Call Back Number: [phone]    Allergies:    Allergies   Allergen Reactions     Other Environmental Allergy Rash        Reason for call: Nurse is reporting that patient has been wetting the bed lately.  VS are stable.  Facility nurse is requesting a UA/UC.      Verbal Order/Direction given by Provider: Obtain a straight cath UA/UC.      Provider giving Order:  Yamileth Perales MD    Verbal Order given to: Nunu Novak RN

## 2023-07-05 NOTE — TELEPHONE ENCOUNTER
[facility] Triage Nurse Telephone Encounter    Provider: MEKA Goldsmith CNP  Facility: [facility]  Facility Type:  [facility]    Caller: Najma   Call Back Number: [phone]    Allergies:    Allergies   Allergen Reactions     Other Environmental Allergy Rash        Reason for call: Pt's daughter is requesting to obtain a UA/UC as the pt has been having increased hallucinations, not wanting to get out of bed, increased incontinence. Pt refused her vital signs this afternoon.     Verbal Order/Direction given by Provider: Ok to obtain a UA/UC    Provider giving Order:  MEKA Etienne CNP     Verbal Order given to: Najma Simon RN

## 2023-07-07 NOTE — PROGRESS NOTES
"[facility] GERIATRICS    Chief Complaint   Patient presents with     RECHECK     HPI:  Shalini Mann is a 77 year old  (1945), who is being seen today for an episodic care visit at: [facility] ([place]) [[id number]]. Today's concern is:  Patient resting in general [facility] area of memory care.  Family history is concerned regarding increased confusion and asking for a urinary analysis and urine culture.  Patient stated no signs or symptoms of acute illness.  Staff reports she is having increased difficulty and behaviors with cares, unable to obtain vitals today for provider.  We will contact family and discuss plan of care.    Allergies, and PMH/PSH reviewed in [facility] today.  REVIEW OF SYSTEMS:  Unobtainable secondary to cognitive impairment.     Objective:   /74   Pulse 83   Temp 97.5  F (36.4  C)   Resp 23   Ht 1.549 m (5' 1\")   Wt 80.3 kg (177 lb)   LMP  (LMP Unknown)   SpO2 98%   BMI 33.44 kg/m    A & O x 1, NAD. Lungs CTA, non labored. RRR, S1/S2 w/o murmur,gallop or rub.  edema.  Abdomen soft, nontender, +BT'S. No focal neurological deficits.      Recent labs in [facility] reviewed by me today.     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 Increased behaviors and resistance with care, likely 2/2 worsening disease process.   -UA/UC per family request.       MED REC REQUIRED  Post Medication Reconciliation Status: patient was not discharged from an inpatient facility or TCU      Orders:  UA/UC    Electronically signed by:   Michelle Davison NP      "

## 2023-07-07 NOTE — LETTER
"    7/7/2023        RE: Shalini Mann  [address] [place] [address]        [facility]    Chief Complaint   Patient presents with     RECHECK     HPI:  Shalini Mann is a 77 year old  (1945), who is being seen today for an episodic care visit at: [facility] ([place]) [[id number]]. Today's concern is:  Patient resting in general [facility] area of memory care.  Family history is concerned regarding increased confusion and asking for a urinary analysis and urine culture.  Patient stated no signs or symptoms of acute illness.  Staff reports she is having increased difficulty and behaviors with cares, unable to obtain vitals today for provider.  We will contact family and discuss plan of care.    Allergies, and PMH/PSH reviewed in [facility] today.  REVIEW OF SYSTEMS:  Unobtainable secondary to cognitive impairment.     Objective:   /74   Pulse 83   Temp 97.5  F (36.4  C)   Resp 23   Ht 1.549 m (5' 1\")   Wt 80.3 kg (177 lb)   LMP  (LMP Unknown)   SpO2 98%   BMI 33.44 kg/m    A & O x 1, NAD. Lungs CTA, non labored. RRR, S1/S2 w/o murmur,gallop or rub.  edema.  Abdomen soft, nontender, +BT'S. No focal neurological deficits.      Recent labs in [facility] reviewed by me today.     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 Increased behaviors and resistance with care, likely 2/2 worsening disease process.   -UA/UC per family request.       MED REC REQUIRED  Post Medication Reconciliation Status: patient was not discharged from an inpatient facility or TCU      Orders:  UA/UC    Electronically signed by:   Michelle Davison NP            Sincerely,        Michelle Davison NP      "

## 2023-07-18 NOTE — PATIENT INSTRUCTIONS
Wash the area keep it clean dry and protected  Use hair dryer on cool setting to thoroughly dry the area  Apply topical cream as directed  Hemoglobin A1c for diabetes screening is performed  Follow-up with your primary care provider for reevaluation and treatment

## 2023-07-18 NOTE — PROGRESS NOTES
Patient presents with:  yeast infection: Under the breast daughter noticed it today      Clinical Decision Making:  Patient has overlying dementia and the daughter helped with the history and physical and treatment plan. Patient is treated with ketoconazole topical antifungal cream for the intertrigo.  Also hemoglobin A1c was obtained for diabetes screening for the intertrigo rash.  Hemoglobin A1c was within normal limits.  Patient will follow-up with primary care provider if not getting good resolution or if new symptoms or concerns arise.        ICD-10-CM    1. Intertrigo  L30.4 Hemoglobin A1c     ketoconazole (NIZORAL) 2 % external cream      2. Screening for condition  Z13.9 Hemoglobin A1c      3. Disorders of glucose transport, unspecified (H)  E74.819 Hemoglobin A1c          Patient Instructions   Wash the area keep it clean dry and protected  Use hair dryer on cool setting to thoroughly dry the area  Apply topical cream as directed  Hemoglobin A1c for diabetes screening is performed  Follow-up with your primary care provider for reevaluation and treatment      HPI:  Shalini Mann is a 77 year old female who presents today with daughter for evaluation of a rash on the intertriginous area between the breast and the abdominal wall.  States that the rash would be there possibly for 1 week.  Daughter was giving the patient a shower and noticed the rash and brought the patient in for evaluation and treatment today.    There has been no diabetes screening recently.  The daughter does not know that the patient has had symptoms or screening for diabetes.    History obtained from daughter and chart review.    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 No pertinent past medical history.    Social History     Tobacco Use     Smoking status: Former     Smokeless tobacco: Never   Substance Use Topics     Alcohol use: Not on file       Review of Systems  As above in HPI otherwise negative.    Vitals:    07/18/23 1818   BP: 128/70   BP Location: Right arm   Patient Position: Sitting   Cuff Size: Adult Small   Pulse: 83   Resp: 20   Temp: 97.5  F (36.4  C)   TempSrc: Oral   SpO2: 98%   Weight: 80 kg (176 lb 4.8 oz)       General: Patient is resting comfortably no acute distress is afebrile  HEENT: Head is normocephalic atraumatic   Skin: With erythematous rash consistent with Candida intertrigo between the breasts and the left and right upper quadrant of the abdomen.  There is no other involvement of the rash on the rest of the body.    Physical Exam      Labs:  Results for orders placed or performed in visit on 07/18/23   Hemoglobin A1c     Status: Normal   Result Value Ref Range    Hemoglobin A1C 5.2 0.0 - 5.6 %     At the end of the encounter, I discussed results, diagnosis, medications. Discussed red flags for immediate return to clinic/ER, as well as indications for follow up if no improvement. Patient understood and agreed to plan. Patient was stable for discharge.

## 2023-10-13 NOTE — TELEPHONE ENCOUNTER
[facility] NON-EMERGENT ENCOUNTER    Shalini Mann is a 78 year old  (1945), Message received today regarding:   Pt tested positive for COVID-19 today. Currently asymptomatic and stable.    Provider notified: MEKA Goldsmith CNP      Electronically signed by:   Claudine Springer RN

## 2023-10-20 NOTE — TELEPHONE ENCOUNTER
[facility] Lab Note     Provider: Seth Mobley NP   Facility: [facility]  Facility Type:  [facility]    Allergies   Allergen Reactions    Strawberry Extract Unknown     Reported by daughter    Other Environmental Allergy Rash       Labs Reviewed by provider: TSH     Verbal Order/Direction given by Provider: Continue Synthroid 100mcg daily.  Check TSH and free T4 on 12/20/23.      Provider giving Order:  Seth Mobley NP     Verbal Order given to: Graciela([phone])    Mario Novak RN

## 2023-11-01 PROBLEM — F41.9 ANXIETY: Status: ACTIVE | Noted: 2023-01-01

## 2023-11-01 PROBLEM — M79.604 RIGHT LEG PAIN: Status: ACTIVE | Noted: 2023-01-01

## 2023-11-01 PROBLEM — D64.9 NORMOCYTIC ANEMIA: Status: ACTIVE | Noted: 2023-01-01

## 2023-11-01 NOTE — LETTER
11/1/2023        RE: Shalini Mann  [address] [place] [address]        [facility] GERIATR[facility]    PRIMARY CARE PROVIDER AND CLINIC:  Seth Mobley NP, [address] [facility] [address]  Chief Complaint   Patient presents with     [facility] Medical Record Number:  [id number]  Place of Service where encounter took place:  [facility] () [[id number]]    Shalini Mann  is a 78 year old  (1945), admitted to the above facility from  [facility] . Hospital stay 9/22/23 through 9/28/23..     HPI:    This is a 79 yo female with PMHx for GERD, HLD, dementia, hypothyroidism and anxiety who presented from [facility] with right leg pain and being unable to ambulate.  Apparently fall 2 weeks prior, no obvious deformity to leg, x-rays negative.  Per elevated TSH increase Synthroid to 100 mcg daily with recheck in 4 to 6 weeks.  No other changes noted, discharged back to care facility.    CODE STATUS/ADVANCE DIRECTIVES DISCUSSION:  No Order  DNR / DNI  ALLERGIES:   Allergies   Allergen Reactions     Strawberry Extract Unknown     Reported by daughter     Other Environmental Allergy Rash      PAST MEDICAL HISTORY: No past medical history on file.   PAST SURGICAL HISTORY:   has no past surgical history on file.  FAMILY HISTORY: family history is not on file.  SOCIAL HISTORY:   reports that she has quit smoking. She has never used smokeless tobacco.  Patient's living condition: lives in a nursing home    Post Discharge Medication Reconciliation Status:   MED REC REQUIRED  Post Medication Reconciliation Status:  Discharge medications reconciled and changed, see notes/orders       Current Outpatient Medications   Medication Sig     acetaminophen (TYLENOL) 500 MG tablet Take 2 tablets (1,000 mg) by mouth 2 times daily. May also take 2 tablets (1,000 mg) daily as needed for mild pain.     bimatoprost (LUMIGAN) 0.01 % SOLN INSTILL 1 DROP IN BOTH  "EYES AT BEDTIME. Strength: 0.01 %     CARBOXYMETHYLCELLULOSE SODIUM 0.5 % SOLN ophthalmic solution INSTILL 1 DROP IN BOTH EYES TWICE DAILY;USE AS DIRECTED FOUR TIMES DAILY AS NEEDED     Eyelid Cleansers (OCUSOFT HYPOCHLOR) SOLN USE SMALL AMOUNT AS DIRECTED AFTER INSTILLING EYE DROPS AS ORDERED     ketoconazole (NIZORAL) 2 % external cream Apply topically daily     levothyroxine (SYNTHROID/LEVOTHROID) 100 MCG tablet Take 100 mcg by mouth daily     Lidocaine (LIDOCARE) 4 % Patch Place 1 patch onto the skin 2 times daily To prevent lidocaine toxicity, patient should be patch free for 12 hrs daily.     BEBETO 128 5 % ophthalmic ointment APPLY TO LOWER CULDECAS OF EACH EYE NIGHTLY BEFORE BED     OLANZapine (ZYPREXA) 5 MG tablet Take 1 tablet (5 mg) by mouth 2 times daily     omeprazole (PRILOSEC OTC) 20 MG EC tablet Take 20 mg by mouth daily     QUEtiapine (SEROQUEL) 25 MG tablet Take 25 mg by mouth 4 times daily     RHOPRESSA 0.02 % ophthalmic solution INSTILL 1 DROP IN THE RIGHT EYE AT BEDTIME     simvastatin (ZOCOR) 40 MG tablet TAKE 1 TAB BY MOUTH EVERY BEDTIME     No current facility-administered medications for this visit.       ROS:  4 point ROS including Respiratory, CV, GI and , other than that noted in the HPI,  is negative    Vitals:  /74   Pulse 98   Temp 97.8  F (36.6  C)   Resp 18   Ht 1.6 m (5' 3\")   Wt 78 kg (172 lb)   LMP  (LMP Unknown)   SpO2 97%   BMI 30.47 kg/m    Exam:  GENERAL APPEARANCE:  morbidly obese, cooperative, more somnolent, answering minimal questions   RESP:  diminished breath sounds bilaterally, RA  CV:  regular rate and rhythm, no murmur, rub, or gallop  PSYCH:  oriented to 1, baseline per nursing staff    Lab/Diagnostic data:  Most Recent 3 CBC's:  Recent Labs   Lab Test 05/26/23  0937 02/20/20  1218   WBC 8.3 6.4   HGB 12.2 12.0   MCV 94 89    254     Most Recent 3 BMP's:  Recent Labs   Lab Test 05/26/23  0937 11/11/22  1734 02/20/20  1218     --  137 "   POTASSIUM 3.5  --  3.7   CHLORIDE 105  --  101   CO2 22  --  27   BUN 6.5*  --  9   CR 0.69 0.55 0.65   ANIONGAP 13  --  9   IGGY 9.1  --  9.4   *  --  93     Most Recent TSH and T4:  Recent Labs   Lab Test 10/20/23  0520   TSH 5.51*       ASSESSMENT/PLAN:    Lack of mobility  Falls (H)  Uses WC per staff, recent XRs neg for fx    (F03.90) Senile dementia, uncomplicated (H)   (F41.9) Anxiety  Today decrease Seroquel to 25 mg 4 times daily and continue Zyprexa 5 mg twice daily.  Seems overtreated    (E66.01) Morbid obesity (H)  Last BMI >40    (E03.9) Hypothyroidism, unspecified type  Recently increased synthroid to 100mcd daily, recheck TSH 5.51 on 10/20    (K21.00) Gastroesophageal reflux disease with esophagitis without hemorrhage  Continue omeprazole 20mg daily    HANNAH  Last Cr 1.03 with GFR 55 on 9/28, recheck BMP    (D64.9) Normocytic anemia  Last Hgb 12.7 on 9/28, recheck CBC    Orders:  Decrease seroquel, recheck TSH/FT4    Electronically signed by:  Seth Mobley NP                   Sincerely,        Seth Mobley NP

## 2023-11-01 NOTE — PROGRESS NOTES
[facility]IATR[facility]    PRIMARY CARE PROVIDER AND CLINIC:  Seth Mobley NP, [address] [facility] [address]  Chief Complaint   Patient presents with    [facility] Medical Record Number:  [id number]  Place of Service where encounter took place:  [facility] () [[id number]]    Shalini Mann  is a 78 year old  (1945), admitted to the above facility from  [facility] . Hospital stay 9/22/23 through 9/28/23..     HPI:    This is a 77 yo female with PMHx for GERD, HLD, dementia, hypothyroidism and anxiety who presented from [facility] with right leg pain and being unable to ambulate.  Apparently fall 2 weeks prior, no obvious deformity to leg, x-rays negative.  Per elevated TSH increase Synthroid to 100 mcg daily with recheck in 4 to 6 weeks.  No other changes noted, discharged back to care facility.    CODE STATUS/ADVANCE DIRECTIVES DISCUSSION:  No Order  DNR / DNI  ALLERGIES:   Allergies   Allergen Reactions    Strawberry Extract Unknown     Reported by daughter    Other Environmental Allergy Rash      PAST MEDICAL HISTORY: No past medical history on file.   PAST SURGICAL HISTORY:   has no past surgical history on file.  FAMILY HISTORY: family history is not on file.  SOCIAL HISTORY:   reports that she has quit smoking. She has never used smokeless tobacco.  Patient's living condition: lives in a nursing home    Post Discharge Medication Reconciliation Status:   MED REC REQUIRED  Post Medication Reconciliation Status:  Discharge medications reconciled and changed, see notes/orders       Current Outpatient Medications   Medication Sig    acetaminophen (TYLENOL) 500 MG tablet Take 2 tablets (1,000 mg) by mouth 2 times daily. May also take 2 tablets (1,000 mg) daily as needed for mild pain.    bimatoprost (LUMIGAN) 0.01 % SOLN INSTILL 1 DROP IN BOTH EYES AT BEDTIME. Strength: 0.01 %    CARBOXYMETHYLCELLULOSE SODIUM 0.5 % SOLN ophthalmic  "solution INSTILL 1 DROP IN BOTH EYES TWICE DAILY;USE AS DIRECTED FOUR TIMES DAILY AS NEEDED    Eyelid Cleansers (OCUSOFT HYPOCHLOR) SOLN USE SMALL AMOUNT AS DIRECTED AFTER INSTILLING EYE DROPS AS ORDERED    ketoconazole (NIZORAL) 2 % external cream Apply topically daily    levothyroxine (SYNTHROID/LEVOTHROID) 100 MCG tablet Take 100 mcg by mouth daily    Lidocaine (LIDOCARE) 4 % Patch Place 1 patch onto the skin 2 times daily To prevent lidocaine toxicity, patient should be patch free for 12 hrs daily.    BEBETO 128 5 % ophthalmic ointment APPLY TO LOWER CULDECAS OF EACH EYE NIGHTLY BEFORE BED    OLANZapine (ZYPREXA) 5 MG tablet Take 1 tablet (5 mg) by mouth 2 times daily    omeprazole (PRILOSEC OTC) 20 MG EC tablet Take 20 mg by mouth daily    QUEtiapine (SEROQUEL) 25 MG tablet Take 25 mg by mouth 4 times daily    RHOPRESSA 0.02 % ophthalmic solution INSTILL 1 DROP IN THE RIGHT EYE AT BEDTIME    simvastatin (ZOCOR) 40 MG tablet TAKE 1 TAB BY MOUTH EVERY BEDTIME     No current facility-administered medications for this visit.       ROS:  4 point ROS including Respiratory, CV, GI and , other than that noted in the HPI,  is negative    Vitals:  /74   Pulse 98   Temp 97.8  F (36.6  C)   Resp 18   Ht 1.6 m (5' 3\")   Wt 78 kg (172 lb)   LMP  (LMP Unknown)   SpO2 97%   BMI 30.47 kg/m    Exam:  GENERAL APPEARANCE:  morbidly obese, cooperative, more somnolent, answering minimal questions   RESP:  diminished breath sounds bilaterally, RA  CV:  regular rate and rhythm, no murmur, rub, or gallop  PSYCH:  oriented to 1, baseline per nursing staff    Lab/Diagnostic data:  Most Recent 3 CBC's:  Recent Labs   Lab Test 05/26/23  0937 02/20/20  1218   WBC 8.3 6.4   HGB 12.2 12.0   MCV 94 89    254     Most Recent 3 BMP's:  Recent Labs   Lab Test 05/26/23  0937 11/11/22  1734 02/20/20  1218     --  137   POTASSIUM 3.5  --  3.7   CHLORIDE 105  --  101   CO2 22  --  27   BUN 6.5*  --  9   CR 0.69 0.55 0.65 "   ANIONGAP 13  --  9   IGGY 9.1  --  9.4   *  --  93     Most Recent TSH and T4:  Recent Labs   Lab Test 10/20/23  0520   TSH 5.51*       ASSESSMENT/PLAN:    Lack of mobility  Falls (H)  Uses WC per staff, recent XRs neg for fx    (F03.90) Senile dementia, uncomplicated (H)   (F41.9) Anxiety  Today decrease Seroquel to 25 mg 4 times daily and continue Zyprexa 5 mg twice daily.  Seems overtreated    (E66.01) Morbid obesity (H)  Last BMI >40    (E03.9) Hypothyroidism, unspecified type  Recently increased synthroid to 100mcd daily, recheck TSH 5.51 on 10/20    (K21.00) Gastroesophageal reflux disease with esophagitis without hemorrhage  Continue omeprazole 20mg daily    HANNAH  Last Cr 1.03 with GFR 55 on 9/28, recheck BMP    (D64.9) Normocytic anemia  Last Hgb 12.7 on 9/28, recheck CBC    Orders:  Decrease seroquel, recheck TSH/FT4    Electronically signed by:  Seth Mobley NP

## 2023-11-12 NOTE — PROGRESS NOTES
"[facility] GERIATRICS  [place] Medical Record Number:  [id number]  Place of Service where encounter took place: [facility] () [[id number]]  CODE STATUS:   DNR / DNI      Chief Complaint/Reason for Visit:  Chief Complaint   Patient presents with    Hospital F/U     MD note to LTC.        HPI:    Shalini Mann is a 78 year old female with hx of dementia, hypothyroidism, hospitalization as noted below in Sept 2023. She had previously lived at the assisted living for [facility] and after the hospitalization is now in LTC.     [facility] Medicine   Discharge Summary Report  Admit Date: 9/22/2023   Discharge Date: 09/28/2023     Brief Summary of Presentation:   From H&P:   \"Shalini Mann is a 78 y.o. female with PMH of GERD, HLD, dementia, hypothyroidism, and anxiety presented to the Ed from [facility], operated by [facility]. Arrived by EMS from [facility] care with reports of right leg pain and inability to ambulate.This fall happened 2 weeks ago. No reported behavior change or being on blood thinner. No history offered beyond that, and history unable to be obtained from patient. Otherwise well appearing without obvious deformity to leg. No radiographic evidence of fracture \"    Procedures/Significant Imaging & Testing:   XR Right Femur // Tib/fib // Knee // Ankle 9/22/20232:  Right femur: No acute fracture dislocation. No joint effusion.    Right tibia and fibula: No acute fracture or dislocation. Atherosclerotic vascular calcifications.    Right knee: Small joint effusion. No acute fracture dislocation. Mild tricompartment osteoarthritis.    Right ankle: No acute fracture or dislocation. Congruent ankle mortise. Mild bimalleolar soft tissue swelling.    CT Hip Rt WO IV Cont 9/22/2023:  1. No acute findings to account for the patient's right hip symptoms.  2. No fracture. No concerning bone lesion.  3. Right hip capsular thickening versus small right hip joint " effusion.  4. Status post hysterectomy. Surgical clips in the right pelvis.    Hospital Course   Right leg pain  Daughter reports multiple falls at facility where staff found her on the floor, patient does not recall falls. She had been able to ambulate in the days leading up to presentation, though daughter noted progressive limp then unable to ambulate on day of arrival. XR of pelvis, right femur, right tib/fib negative. CT with possible small right hip joint effusion.   - Continue pain management with ice and PRN analgesics  - PT/OT/CM- pt requiring higher level of care (LTC)    Hypothyroidism   TSH 9.88, T4 1.00 skilled facility lists as 75mcg levothyroxine qd. unsure about patient's compliance. She had been on Levothyroxine 100 mcg in the past.   - Started on Levothyroxine 100 mcg  - recheck TSH in 4-6 weeks at [facility]    Chronic Issues:   Dyslipidemia   - Continue PTA simvastatin     Gastroesophageal reflux disease with esophagitis without hemorrhage  - Continue PTA PPI     Anxiety   - Continue PTA quetiapine 37.5mg TID and daily PRN 25mg   - Continue PTA olanzapine 5mg BID    Elevated Blood Pressure without diagnosis of Hypertension   No hypertension history and not on BP medications prior to arrival. -150s/80/90s.   - Recommend outpatient BP check     Dementia: at baseline, samaria mccray    Discharged from the hospital to LTC on 9/28/2023.     Today:  She was seen in the ED on 9/29/2023 secondary to what was felt to be vasovagal episode in the bathroom. She tested positive for COVID-19 on 10/13/2023, asymptomatic and not requiring treatment. Otherwise has been stable. Was on Seroquel 37.5 mg TID at 0800, 1300 and 2100 with 25 mg at 1700. This was recently changed to 25 mg QID as dose reduction trial. There are no new concerns per nursing. She is seen in her room, unable to obtain history from patient, nonsensical talking. She has no fever. Bps acceptable. Also on olanzapine BID in addition to Seroquel. She  takes levothyroxine for hypothyroidism, noted to be post ablative secondary to hx of Graves disease. She is also on Zocor for HLD and Lumigan eye drops for glaucoma.       REVIEW OF SYSTEMS:  All others negative other than those noted in HPI.      PAST MEDICAL HISTORY:  Past Medical History:   Diagnosis Date    Dementia (H)     Gastroesophageal reflux disease     Hypothyroidism    Hx Graves disease, SBO, HLD, OA, skin cancer.       PAST SURGICAL HISTORY:  Past Surgical History:   Procedure Laterality Date    HYSTERECTOMY         FAMILY HISTORY:  Unable to obtain from patient secondary to dementia.       SOCIAL HISTORY:  Social History     Socioeconomic History    Marital status:      Spouse name: Not on file    Number of children: Not on file    Years of education: Not on file    Highest education level: Not on file   Occupational History    Not on file   Tobacco Use    Smoking status: Former    Smokeless tobacco: Never   Substance and Sexual Activity    Alcohol use: Not on file    Drug use: Not on file    Sexual activity: Not on file   Other Topics Concern    Not on file   Social History Narrative    Not on file     Social Determinants of Health     Financial Resource Strain: Not on file   Food Insecurity: Not on file   Transportation Needs: Not on file   Physical Activity: Not on file   Stress: Not on file   Social Connections: Not on file   Interpersonal Safety: Not on file   Housing Stability: Not on file       MEDICATIONS:  Current Outpatient Medications   Medication Sig Dispense Refill    bimatoprost (LUMIGAN) 0.01 % SOLN INSTILL 1 DROP IN BOTH EYES AT BEDTIME. Strength: 0.01 % 2.5 mL 11    ketoconazole (NIZORAL) 2 % external cream Apply topically daily      levothyroxine (SYNTHROID/LEVOTHROID) 100 MCG tablet Take 100 mcg by mouth daily      Lidocaine (LIDOCARE) 4 % Patch Place 1 patch onto the skin 2 times daily To prevent lidocaine toxicity, patient should be patch free for 12 hrs daily.      BEBETO  128 5 % ophthalmic ointment APPLY TO LOWER CULDECAS OF EACH EYE NIGHTLY BEFORE BED 3.5 g 10    OLANZapine (ZYPREXA) 5 MG tablet Take 1 tablet (5 mg) by mouth 2 times daily 62 tablet 11    omeprazole (PRILOSEC OTC) 20 MG EC tablet Take 20 mg by mouth daily      QUEtiapine (SEROQUEL) 25 MG tablet Take 25 mg by mouth 4 times daily      RHOPRESSA 0.02 % ophthalmic solution INSTILL 1 DROP IN THE RIGHT EYE AT BEDTIME 2.5 mL 11    simvastatin (ZOCOR) 40 MG tablet TAKE 1 TAB BY MOUTH EVERY BEDTIME 90 tablet 3        ALLERGIES:  Allergies   Allergen Reactions    Strawberry Extract Unknown     Reported by daughter    Other Environmental Allergy Rash       PHYSICAL EXAM:  General: Patient is alert female, laying in bed, no distress.   Vitals: /74, Temp 97.8, Pulse 88, RR 18, O2 sat 97% RA.  HEENT: Head is NCAT. Eyes show no injection or icterus. Nares negative. Oropharynx well hydrated.  Neck: Supple. No tenderness or adenopathy. No JVD.  Lungs: Clear bilaterally. No wheezes.  Cardiovascular: Regular rate and rhythm, normal S1. S2.  Back: No spinal or CVA tenderness.  Abdomen: Soft, no tenderness on exam. Bowel sounds present. No guarding rebound or rigidity.  : Deferred.  Extremities: No edema is noted.  Musculoskeletal: Degen changes.   Skin: Warm and dry.   Psych: Dementia, nonsensical responses, talks to self.       LABS/DIAGNOSTIC DATA:  Component      Latest Ref Rng 5/26/2023  9:37 AM 10/20/2023  5:20 AM   TSH      0.30 - 4.20 uIU/mL 0.05 (L)  5.51 (H)       Component  Ref Range & Units  9/22/2023   WBC  3.5 - 10.5 x10(9)/L 7.3   RBC  3.90 - 5.03 x10(12)/L 3.93   Hemoglobin  12.0 - 15.5 g/dL 12.1   HCT  34.9 - 44.5 % 35.8   MCV  80.0 - 100.0 fL 91.1   MCH  27.6 - 33.3 pg 30.8   MCHC  31.5 - 35.2 g/dL 33.8   RDW  11.9 - 15.5 % 13.2   Platelets  150 - 450 x10(9)/L 241   Automated NRBC  <=0 /100 WBC 0     Component  Ref Range & Units  9/22/2023   Sodium  136 - 145 mmol/L 141   Potassium  3.5 - 5.1 mmol/L 3.7    Chloride  98 - 109 mmol/L 107   CO2  20 - 29 mmol/L 23   Anion Gap  7 - 16 mmol/L 11   Calcium  8.4 - 10.4 mg/dL 8.8   BUN  7 - 26 mg/dL 8   Creatinine  0.55 - 1.02 mg/dL 0.67   Glucose  70 - 100 mg/dL 113 High    Comment: The given reference range is for the fasting state. Non-fasting reference range for glucose is 70 - 180 mg/dL.   GFR, Estimated  >60 mL/min/1.73m2 >60       ASSESSMENT/PLAN:  Dementia. She had previously lived in [facility], now LTC. On olanzapine and Seroquel, recent GDR on Seroquel, continuing to monitor.   Hypothyroidism. On replacement levothyroxine, continue.   HLD. Continue simvastatin.   Glaucoma. On Lumigan eye drops.  Recent positive COVID-19 test 10/13/2023. Asymptomatic, no treatment was necessary.   GERD. Continue omeprazole.   Code status is DNR/DNI.        Electronically signed by: Becca Luna MD

## 2023-12-19 ENCOUNTER — LAB REQUISITION (OUTPATIENT)
Dept: LAB | Facility: CLINIC | Age: 78
End: 2023-12-19
Payer: MEDICARE